# Patient Record
Sex: MALE | Race: WHITE
[De-identification: names, ages, dates, MRNs, and addresses within clinical notes are randomized per-mention and may not be internally consistent; named-entity substitution may affect disease eponyms.]

---

## 2021-09-01 PROBLEM — Z00.00 ENCOUNTER FOR PREVENTIVE HEALTH EXAMINATION: Status: ACTIVE | Noted: 2021-09-01

## 2021-11-02 ENCOUNTER — APPOINTMENT (OUTPATIENT)
Dept: GASTROENTEROLOGY | Facility: CLINIC | Age: 81
End: 2021-11-02

## 2022-06-30 NOTE — H&P ADULT - ASSESSMENT
82yr old male PMH HTN, HLD, CAD,  CABG x 3, sleep apnea, stents presented to cardiology for routine follow-up and clearance for possible knee surgery in the near future. Pt had stress test on 6/22/22 showing ischemia in the inferior walls and abnormal stress ekg response. Pt. now referred for [facility] [place] for further evaluation.   -Consent obtained for cardiac catheterization w/ coronary angiogram and possible stent placement. Pt is competent, has capacity, and understands risks and benefits of procedure. Risks and benefits discussed. Risk discussed included, but not limited to MI, stroke, mortality, major bleeding, arrythmia, or infection. All questions answered     ASA class: II  Creatinine: 1.04  GFR:  72  Bleeding  Risk score: 3.9%  Jose Elias Score: 5 pts prehydration NS 2500cc/hr x 1 hr ordered

## 2022-06-30 NOTE — H&P ADULT - NSHPLABSRESULTS_GEN_ALL_CORE
6/22/22 Cardiac PET revealed abnormal myocardial perfusion imaging with a medium sized area of moderate ischemia seen in the inferior walls. 7/1/22- EKG  SR with 1st degree AV block and PVC  rate 71      6/22/22 Cardiac PET   abnormal myocardial perfusion imaging with a medium sized area of moderate ischemia seen in the inferior walls.  Abnormal stress EKG response showing 2mm st depressions

## 2022-06-30 NOTE — H&P ADULT - NSICDXFAMILYHX_GEN_ALL_CORE_FT
FAMILY HISTORY:  Father  Still living? Unknown  Family history of Hodgkin's lymphoma, Age at diagnosis: Age Unknown

## 2022-06-30 NOTE — CHART NOTE - NSCHARTNOTEFT_GEN_A_CORE
Preop Phone Call:  - Able to Reach Patient:  yes  - Info given to: Maranda Peterson wife of patient having cardiac catheterization  -  and allergies confirmed: yes  - Medication Information Given: yes  - Medications To Take ASA, Ramipril, Toprol XL 50mg Ok to take a.m. meds w/sip of water  - Medications To Hold   - Arrival Time: 07:15  - NPO after: midnight 	  - Understanding of Information Verbalized: yes  will have a  over the age of 18  for d/c home

## 2022-06-30 NOTE — H&P ADULT - HISTORY OF PRESENT ILLNESS
82yr old male PMH HTN, HLD, CAD,  CABG, sleep apnea, stents for routine follow-up. Denies c/o chest pain or SOB. Pt. contemplating knee surgery. Last stress test last year was negative for ischemia. Pt. had a recent stress 6/22/22 which revealed abnormal myocardial perfusion imaging with a medium sized area of moderate ischemia seen in the inferior walls. Pt. now referred for [facility] for further evaluation.  82yr old male PMH HTN, HLD, CAD,  CABG x 3, sleep apnea, stents presented to cardiology for routine follow-up and clearance for possible knee surgery in the near future. Pt had stress test on 6/22/22 showing ischemia in the inferior walls and abnormal stress ekg response. Pt. now referred for [facility] for further evaluation.   Covid 19 Neg

## 2022-06-30 NOTE — H&P ADULT - NSHPSOCIALHISTORY_GEN_ALL_CORE
Pt lives at home with wife Maranda ( [phone]) and Son Arron.   Pt quit smoking 55 years ago  Drinks 3-4 glasses of wine per week  denies all illicit drug use

## 2022-06-30 NOTE — H&P ADULT - NSICDXPASTMEDICALHX_GEN_ALL_CORE_FT
PAST MEDICAL HISTORY:  CAD (coronary atherosclerotic disease)     HLD (hyperlipidemia)     HTN (hypertension)     Sleep apnea      PAST MEDICAL HISTORY:  CAD (coronary atherosclerotic disease) stents    HLD (hyperlipidemia)     HTN (hypertension)     Sleep apnea

## 2022-07-01 ENCOUNTER — OUTPATIENT (OUTPATIENT)
Dept: OUTPATIENT SERVICES | Facility: HOSPITAL | Age: 82
LOS: 1 days | Discharge: HOME | End: 2022-07-01
Payer: MEDICARE

## 2022-07-01 VITALS
HEART RATE: 50 BPM | OXYGEN SATURATION: 99 % | RESPIRATION RATE: 16 BRPM | DIASTOLIC BLOOD PRESSURE: 57 MMHG | SYSTOLIC BLOOD PRESSURE: 150 MMHG

## 2022-07-01 VITALS
WEIGHT: 227.08 LBS | HEIGHT: 67 IN | SYSTOLIC BLOOD PRESSURE: 135 MMHG | TEMPERATURE: 99 F | HEART RATE: 70 BPM | RESPIRATION RATE: 18 BRPM | DIASTOLIC BLOOD PRESSURE: 71 MMHG | OXYGEN SATURATION: 100 %

## 2022-07-01 DIAGNOSIS — R94.39 ABNORMAL RESULT OF OTHER CARDIOVASCULAR FUNCTION STUDY: ICD-10-CM

## 2022-07-01 DIAGNOSIS — Z95.1 PRESENCE OF AORTOCORONARY BYPASS GRAFT: Chronic | ICD-10-CM

## 2022-07-01 PROCEDURE — C1887: CPT

## 2022-07-01 PROCEDURE — C1894: CPT

## 2022-07-01 PROCEDURE — 93455 CORONARY ART/GRFT ANGIO S&I: CPT

## 2022-07-01 PROCEDURE — C1769: CPT

## 2022-07-01 PROCEDURE — 93567 NJX CAR CTH SPRVLV AORTGRPHY: CPT

## 2022-07-01 PROCEDURE — 99153 MOD SED SAME PHYS/QHP EA: CPT

## 2022-07-01 PROCEDURE — 93005 ELECTROCARDIOGRAM TRACING: CPT | Mod: XU

## 2022-07-01 PROCEDURE — 93010 ELECTROCARDIOGRAM REPORT: CPT

## 2022-07-01 PROCEDURE — 99152 MOD SED SAME PHYS/QHP 5/>YRS: CPT

## 2022-07-01 RX ORDER — PITAVASTATIN CALCIUM 1.04 MG/1
1 TABLET, FILM COATED ORAL
Qty: 0 | Refills: 0 | DISCHARGE

## 2022-07-01 RX ORDER — RAMIPRIL 5 MG
1 CAPSULE ORAL
Qty: 0 | Refills: 0 | DISCHARGE

## 2022-07-01 RX ORDER — SODIUM CHLORIDE 9 MG/ML
1000 INJECTION INTRAMUSCULAR; INTRAVENOUS; SUBCUTANEOUS
Refills: 0 | Status: DISCONTINUED | OUTPATIENT
Start: 2022-07-01 | End: 2022-07-01

## 2022-07-01 RX ORDER — SODIUM CHLORIDE 9 MG/ML
250 INJECTION INTRAMUSCULAR; INTRAVENOUS; SUBCUTANEOUS ONCE
Refills: 0 | Status: COMPLETED | OUTPATIENT
Start: 2022-07-01 | End: 2022-07-01

## 2022-07-01 RX ORDER — METOPROLOL TARTRATE 50 MG
1 TABLET ORAL
Qty: 0 | Refills: 0 | DISCHARGE

## 2022-07-01 RX ORDER — ASPIRIN/CALCIUM CARB/MAGNESIUM 324 MG
1 TABLET ORAL
Qty: 0 | Refills: 0 | DISCHARGE

## 2022-07-01 RX ADMIN — SODIUM CHLORIDE 250 MILLILITER(S): 9 INJECTION INTRAMUSCULAR; INTRAVENOUS; SUBCUTANEOUS at 08:15

## 2022-07-01 RX ADMIN — SODIUM CHLORIDE 206 MILLILITER(S): 9 INJECTION INTRAMUSCULAR; INTRAVENOUS; SUBCUTANEOUS at 11:48

## 2022-07-01 NOTE — ASU PATIENT PROFILE, ADULT - SURGICAL SITE INCISION
4 week device check and ov w/NPFW per NPFW. Normal device function. All testing appear wnl. 10.9 years to VELIA. Presenting AsVs @ 90 bpm  AP 1.9%   0.3% MVP On  AT/AF burden 5.1%  3 AT/AF episodes. Appears  pAT/AFL with onset on 4/29/21 on/off thru 5/1/21. Max avg v rate of 139 bpm  Optivol WNL  PVC singles 0.4/hr  No changes  See Paceart report under the Cardiology tab. Follow up in 3 months either in office or remotely. Discussed home monitor. Revisited the need to send manual transmissions verses automatic transmissions. Patient verbalized understanding.
no

## 2022-07-01 NOTE — ASU PATIENT PROFILE, ADULT - FALL HARM RISK - UNIVERSAL INTERVENTIONS
Bed in lowest position, wheels locked, appropriate side rails in place/Call bell, personal items and telephone in reach/Instruct patient to call for assistance before getting out of bed or chair/Non-slip footwear when patient is out of bed/[place] to call system/Physically safe environment - no spills, clutter or unnecessary equipment/Purposeful Proactive Rounding/Room/bathroom lighting operational, light cord in reach

## 2022-07-01 NOTE — ASU PATIENT PROFILE, ADULT - NSICDXPASTMEDICALHX_GEN_ALL_CORE_FT
PAST MEDICAL HISTORY:  CAD (coronary atherosclerotic disease)     HLD (hyperlipidemia)     HTN (hypertension)     Sleep apnea

## 2022-07-01 NOTE — ASU PREOP CHECKLIST - HEIGHT IN INCHES
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 He needs medical clearance to proceed.  He has no history of diabetes, no vision changes, no syncopal episodes no history of seizures. He's been doing this for over 20 years. The patient denied any chest pain, no sob, no dixon, no  pnd, no orthopnea, no headache, no changes in vision, no numbness or tingling, no nausea, no diarrhea, no abdominal pain, no fevers, no chills, no bright red blood per rectum, no  difficulty urinating, no burning during micturition, no depressed mood, no other concerns.      
7

## 2022-07-01 NOTE — PACU DISCHARGE NOTE - COMMENTS
Pt alert and oriented x 4, no c/o chest pain or SOB.  Right groin tegaderm intact, no s/s bleeding or hematoma.  Right pedal pulse by doppler. Written discharge instructions reviewed with patient and understanding verbalized.  Patient instructed to f/u with Dr. Curiel.  IV discontinued, no s/s of bleeding or redness noted. Pt discharged to home, left unit in wheelchair in Stable condition.

## 2022-07-05 DIAGNOSIS — I10 ESSENTIAL (PRIMARY) HYPERTENSION: ICD-10-CM

## 2022-07-05 DIAGNOSIS — I25.10 ATHEROSCLEROTIC HEART DISEASE OF NATIVE CORONARY ARTERY WITHOUT ANGINA PECTORIS: ICD-10-CM

## 2022-07-05 DIAGNOSIS — Z95.5 PRESENCE OF CORONARY ANGIOPLASTY IMPLANT AND GRAFT: ICD-10-CM

## 2023-05-04 ENCOUNTER — OFFICE (OUTPATIENT)
Dept: URBAN - METROPOLITAN AREA CLINIC 102 | Facility: CLINIC | Age: 83
Setting detail: OPHTHALMOLOGY
End: 2023-05-04
Payer: MEDICARE

## 2023-05-04 VITALS — HEIGHT: 60 IN

## 2023-05-04 DIAGNOSIS — H25.13: ICD-10-CM

## 2023-05-04 DIAGNOSIS — H53.022: ICD-10-CM

## 2023-05-04 DIAGNOSIS — D31.31: ICD-10-CM

## 2023-05-04 DIAGNOSIS — H40.013: ICD-10-CM

## 2023-05-04 PROCEDURE — 92020 GONIOSCOPY: CPT | Performed by: OPHTHALMOLOGY

## 2023-05-04 PROCEDURE — 92133 CPTRZD OPH DX IMG PST SGM ON: CPT | Performed by: OPHTHALMOLOGY

## 2023-05-04 PROCEDURE — 99214 OFFICE O/P EST MOD 30 MIN: CPT | Performed by: OPHTHALMOLOGY

## 2023-05-04 PROCEDURE — 92083 EXTENDED VISUAL FIELD XM: CPT | Performed by: OPHTHALMOLOGY

## 2023-05-04 ASSESSMENT — KERATOMETRY
OD_K1POWER_DIOPTERS: 44.75
OD_K2POWER_DIOPTERS: 45.50
METHOD_AUTO_MANUAL: AUTO
OS_K2POWER_DIOPTERS: 46.25
OS_K1POWER_DIOPTERS: 43.75
OD_AXISANGLE_DEGREES: 161
OS_AXISANGLE_DEGREES: 075

## 2023-05-04 ASSESSMENT — TONOMETRY
OS_IOP_MMHG: 18
OS_IOP_MMHG: 16
OD_IOP_MMHG: 20
OD_IOP_MMHG: 15

## 2023-05-04 ASSESSMENT — AXIALLENGTH_DERIVED
OD_AL: 22.2483
OS_AL: 21.9467
OD_AL: 22.8716
OS_AL: 22.0739
OD_AL: 22.7804
OS_AL: 22.0739

## 2023-05-04 ASSESSMENT — REFRACTION_MANIFEST
OS_AXIS: 170
OS_CYLINDER: -4.50
OS_CYLINDER: -4.50
OD_CYLINDER: -1.25
OS_ADD: +2.25
OS_SPHERE: +5.00
OS_VA1: 20/40
OS_SPHERE: +5.00
OS_VA1: 20/50-
OD_AXIS: 80
OD_ADD: +2.25
OD_SPHERE: +0.75
OS_AXIS: 170
OD_AXIS: 90
OD_SPHERE: +2.75
OD_VA1: 20/40-1
OD_VA1: 20/30
OD_CYLINDER: -0.75

## 2023-05-04 ASSESSMENT — SPHEQUIV_DERIVED
OS_SPHEQUIV: 2.75
OS_SPHEQUIV: 2.75
OD_SPHEQUIV: 0.375
OD_SPHEQUIV: 2.125
OS_SPHEQUIV: 3.125
OD_SPHEQUIV: 0.625

## 2023-05-04 ASSESSMENT — REFRACTION_CURRENTRX
OS_OVR_VA: 20/
OS_CYLINDER: -3.00
OS_SPHERE: +4.50
OD_AXIS: 045
OS_AXIS: 179
OD_CYLINDER: -0.50
OD_VPRISM_DIRECTION: SV
OD_SPHERE: +2.00
OS_VPRISM_DIRECTION: SV
OD_OVR_VA: 20/

## 2023-05-04 ASSESSMENT — REFRACTION_AUTOREFRACTION
OS_AXIS: 170
OD_CYLINDER: -0.75
OD_AXIS: 082
OS_SPHERE: +5.25
OS_CYLINDER: -4.25
OD_SPHERE: +1.00

## 2023-05-04 ASSESSMENT — VISUAL ACUITY
OD_BCVA: 20/60
OS_BCVA: 20/50+

## 2023-05-04 ASSESSMENT — CONFRONTATIONAL VISUAL FIELD TEST (CVF)
OS_FINDINGS: FULL
OD_FINDINGS: FULL

## 2023-06-20 ENCOUNTER — OFFICE (OUTPATIENT)
Dept: URBAN - METROPOLITAN AREA CLINIC 102 | Facility: CLINIC | Age: 83
Setting detail: OPHTHALMOLOGY
End: 2023-06-20
Payer: MEDICARE

## 2023-06-20 DIAGNOSIS — D31.31: ICD-10-CM

## 2023-06-20 DIAGNOSIS — H25.12: ICD-10-CM

## 2023-06-20 DIAGNOSIS — H25.13: ICD-10-CM

## 2023-06-20 DIAGNOSIS — H40.013: ICD-10-CM

## 2023-06-20 DIAGNOSIS — H53.022: ICD-10-CM

## 2023-06-20 PROCEDURE — 99213 OFFICE O/P EST LOW 20 MIN: CPT | Performed by: OPHTHALMOLOGY

## 2023-06-20 PROCEDURE — 92136 OPHTHALMIC BIOMETRY: CPT | Performed by: OPHTHALMOLOGY

## 2023-06-20 ASSESSMENT — KERATOMETRY
OD_AXISANGLE_DEGREES: 58
OD_K2POWER_DIOPTERS: 45.50
OS_CYLPOWER_DEGREES: 3
OS_K1POWER_DIOPTERS: 43.75
OD_AXISANGLE2_DEGREES: 148
OS_AXISANGLE2_DEGREES: 084
OS_K2POWER_DIOPTERS: 46.75
OS_K1K2_AVERAGE: 45.25
METHOD_AUTO_MANUAL: AUTO
OD_K2POWER_DIOPTERS: 45.50
OS_AXISANGLE_DEGREES: 084
OD_CYLAXISANGLE_DEGREES: 148
OS_K1POWER_DIOPTERS: 43.75
OD_AXISANGLE_DEGREES: 148
OS_K2POWER_DIOPTERS: 46.75
OD_K1POWER_DIOPTERS: 45.00
OS_AXISANGLE_DEGREES: 174
OS_CYLAXISANGLE_DEGREES: 084
OD_K1POWER_DIOPTERS: 45.00
OD_K1K2_AVERAGE: 45.25
OD_CYLPOWER_DEGREES: 0.5

## 2023-06-20 ASSESSMENT — REFRACTION_MANIFEST
OD_CYLINDER: -0.75
OS_AXIS: 170
OD_ADD: +2.25
OD_VA1: 20/30
OD_CYLINDER: -1.25
OS_VA1: 20/40
OS_SPHERE: +5.00
OS_CYLINDER: -4.50
OD_SPHERE: +2.75
OD_AXIS: 90
OS_AXIS: 170
OS_CYLINDER: -4.50
OS_ADD: +2.25
OD_AXIS: 80
OD_SPHERE: +0.75
OS_SPHERE: +5.00
OD_VA1: 20/40-1
OS_VA1: 20/50-

## 2023-06-20 ASSESSMENT — AXIALLENGTH_DERIVED
OS_AL: 21.9938
OS_AL: 21.8676
OD_AL: 22.2075
OD_AL: 22.8285
OD_AL: 22.8742
OS_AL: 21.9938

## 2023-06-20 ASSESSMENT — REFRACTION_AUTOREFRACTION
OS_SPHERE: +5.50
OD_CYLINDER: -1.50
OS_CYLINDER: -4.75
OD_AXIS: 086
OS_AXIS: 175
OD_SPHERE: +1.00

## 2023-06-20 ASSESSMENT — SPHEQUIV_DERIVED
OS_SPHEQUIV: 2.75
OS_SPHEQUIV: 3.125
OD_SPHEQUIV: 0.25
OD_SPHEQUIV: 0.375
OS_SPHEQUIV: 2.75
OD_SPHEQUIV: 2.125

## 2023-06-20 ASSESSMENT — REFRACTION_CURRENTRX
OD_VPRISM_DIRECTION: SV
OD_OVR_VA: 20/
OS_AXIS: 179
OS_SPHERE: +4.50
OS_OVR_VA: 20/
OS_CYLINDER: -3.00
OD_CYLINDER: -0.50
OD_SPHERE: +2.00
OS_VPRISM_DIRECTION: SV
OD_AXIS: 045

## 2023-06-20 ASSESSMENT — TONOMETRY
OS_IOP_MMHG: 16
OD_IOP_MMHG: 15

## 2023-06-20 ASSESSMENT — CONFRONTATIONAL VISUAL FIELD TEST (CVF)
OD_FINDINGS: FULL
OS_FINDINGS: FULL

## 2023-06-20 ASSESSMENT — VISUAL ACUITY
OD_BCVA: 20/70-1
OS_BCVA: 20/50-1

## 2023-07-10 PROBLEM — H25.13 CATARACT SENILE NUCLEAR SCLEROSIS; LEFT EYE, BOTH EYES: Status: ACTIVE | Noted: 2023-06-20

## 2023-07-10 PROBLEM — H25.12 CATARACT SENILE NUCLEAR SCLEROSIS; LEFT EYE, BOTH EYES: Status: ACTIVE | Noted: 2023-06-20

## 2023-07-12 ENCOUNTER — AMBULATORY SURGERY (OUTPATIENT)
Dept: URBAN - METROPOLITAN AREA SURGERY 14 | Facility: SURGERY | Age: 83
Setting detail: OPHTHALMOLOGY
End: 2023-07-12
Payer: MEDICARE

## 2023-07-12 DIAGNOSIS — H25.11: ICD-10-CM

## 2023-07-12 PROCEDURE — 66984 XCAPSL CTRC RMVL W/O ECP: CPT | Performed by: OPHTHALMOLOGY

## 2023-07-13 ENCOUNTER — RX ONLY (RX ONLY)
Age: 83
End: 2023-07-13

## 2023-07-13 ENCOUNTER — OFFICE (OUTPATIENT)
Dept: URBAN - METROPOLITAN AREA CLINIC 102 | Facility: CLINIC | Age: 83
Setting detail: OPHTHALMOLOGY
End: 2023-07-13
Payer: MEDICARE

## 2023-07-13 DIAGNOSIS — Z96.1: ICD-10-CM

## 2023-07-13 PROCEDURE — 99024 POSTOP FOLLOW-UP VISIT: CPT | Performed by: OPHTHALMOLOGY

## 2023-07-13 ASSESSMENT — REFRACTION_AUTOREFRACTION
OD_SPHERE: -1.00
OS_AXIS: 173
OD_SPHERE: -1.00
OS_CYLINDER: -3.75
OD_CYLINDER: -2.25
OS_SPHERE: +5.25
OD_CYLINDER: -2.25
OD_AXIS: 074
OS_CYLINDER: -3.75
OS_SPHERE: +5.25
OS_AXIS: 173
OD_AXIS: 074

## 2023-07-13 ASSESSMENT — CONFRONTATIONAL VISUAL FIELD TEST (CVF)
OD_FINDINGS: FULL
OD_FINDINGS: FULL
OS_FINDINGS: FULL
OS_FINDINGS: FULL

## 2023-07-13 ASSESSMENT — REFRACTION_MANIFEST
OS_AXIS: 170
OS_CYLINDER: -4.50
OS_VA1: 20/50-
OD_CYLINDER: -1.25
OD_AXIS: 90
OD_AXIS: 80
OD_ADD: +2.25
OS_AXIS: 170
OS_VA1: 20/50-
OD_SPHERE: +0.75
OS_AXIS: 170
OD_AXIS: 80
OD_ADD: +2.25
OS_VA1: 20/40
OD_SPHERE: +2.75
OD_VA1: 20/40-1
OS_ADD: +2.25
OD_AXIS: 90
OS_SPHERE: +5.00
OD_VA1: 20/30
OS_CYLINDER: -4.50
OD_CYLINDER: -0.75
OD_VA1: 20/30
OS_VA1: 20/40
OD_CYLINDER: -0.75
OS_SPHERE: +5.00
OD_SPHERE: +2.75
OD_SPHERE: +0.75
OS_ADD: +2.25
OD_CYLINDER: -1.25
OS_CYLINDER: -4.50
OD_VA1: 20/40-1
OS_AXIS: 170
OS_CYLINDER: -4.50
OS_SPHERE: +5.00
OS_SPHERE: +5.00

## 2023-07-13 ASSESSMENT — SPHEQUIV_DERIVED
OS_SPHEQUIV: 3.375
OD_SPHEQUIV: 2.125
OS_SPHEQUIV: 2.75
OD_SPHEQUIV: -2.125
OS_SPHEQUIV: 2.75
OD_SPHEQUIV: -2.125
OS_SPHEQUIV: 3.375
OD_SPHEQUIV: 0.375
OD_SPHEQUIV: 2.125
OD_SPHEQUIV: 0.375

## 2023-07-13 ASSESSMENT — KERATOMETRY
OS_AXISANGLE_DEGREES: 086
METHOD_AUTO_MANUAL: AUTO
OS_K1POWER_DIOPTERS: 43.75
OD_AXISANGLE_DEGREES: 021
OD_K1POWER_DIOPTERS: 45.00
OS_K2POWER_DIOPTERS: 46.50
OS_K1POWER_DIOPTERS: 43.75
METHOD_AUTO_MANUAL: AUTO
OS_AXISANGLE_DEGREES: 086
OD_K2POWER_DIOPTERS: 47.00
OD_K2POWER_DIOPTERS: 47.00
OD_K1POWER_DIOPTERS: 45.00
OD_AXISANGLE_DEGREES: 021
OS_K2POWER_DIOPTERS: 46.50

## 2023-07-13 ASSESSMENT — REFRACTION_CURRENTRX
OD_SPHERE: +2.00
OS_CYLINDER: -3.00
OD_VPRISM_DIRECTION: SV
OD_CYLINDER: -0.50
OS_SPHERE: +4.50
OD_OVR_VA: 20/
OS_OVR_VA: 20/
OS_AXIS: 179
OD_CYLINDER: -0.50
OD_AXIS: 045
OS_SPHERE: +4.50
OD_OVR_VA: 20/
OS_VPRISM_DIRECTION: SV
OD_AXIS: 045
OD_SPHERE: +2.00
OS_AXIS: 179
OS_CYLINDER: -3.00
OS_OVR_VA: 20/
OD_VPRISM_DIRECTION: SV
OS_VPRISM_DIRECTION: SV

## 2023-07-13 ASSESSMENT — AXIALLENGTH_DERIVED
OD_AL: 22.5735
OD_AL: 21.9661
OD_AL: 21.9661
OS_AL: 22.0338
OD_AL: 23.5019
OD_AL: 22.5735
OD_AL: 23.5019
OS_AL: 21.8234
OS_AL: 22.0338
OS_AL: 21.8234

## 2023-07-13 ASSESSMENT — TONOMETRY
OS_IOP_MMHG: 19
OS_IOP_MMHG: 20

## 2023-07-13 ASSESSMENT — VISUAL ACUITY
OS_BCVA: 20/60+
OD_BCVA: 20/80
OD_BCVA: 20/
OS_BCVA: 20/100-1

## 2023-07-13 ASSESSMENT — CORNEAL EDEMA - MICROCYSTIC EPITHELIAL EDEMA (MCE)
OD_MCE: 2+
OD_MCE: 3+

## 2023-07-14 ENCOUNTER — RX ONLY (RX ONLY)
Age: 83
End: 2023-07-14

## 2023-07-14 ENCOUNTER — OFFICE (OUTPATIENT)
Dept: URBAN - METROPOLITAN AREA CLINIC 115 | Facility: CLINIC | Age: 83
Setting detail: OPHTHALMOLOGY
End: 2023-07-14
Payer: MEDICARE

## 2023-07-14 DIAGNOSIS — Z96.1: ICD-10-CM

## 2023-07-14 PROCEDURE — 99024 POSTOP FOLLOW-UP VISIT: CPT | Performed by: OPTOMETRIST

## 2023-07-14 ASSESSMENT — AXIALLENGTH_DERIVED
OS_AL: 22.0338
OD_AL: 21.9661
OD_AL: 23.5019
OD_AL: 22.5735
OS_AL: 22.0338
OS_AL: 21.8234

## 2023-07-14 ASSESSMENT — REFRACTION_MANIFEST
OS_CYLINDER: -4.50
OS_SPHERE: +5.00
OS_VA1: 20/50-
OS_ADD: +2.25
OD_AXIS: 80
OD_SPHERE: +2.75
OS_VA1: 20/40
OS_CYLINDER: -4.50
OD_VA1: 20/30
OS_AXIS: 170
OD_SPHERE: +0.75
OS_AXIS: 170
OD_CYLINDER: -1.25
OD_AXIS: 90
OD_ADD: +2.25
OS_SPHERE: +5.00
OD_CYLINDER: -0.75
OD_VA1: 20/40-1

## 2023-07-14 ASSESSMENT — REFRACTION_AUTOREFRACTION
OS_SPHERE: +5.25
OS_CYLINDER: -3.75
OD_AXIS: 074
OD_CYLINDER: -2.25
OD_SPHERE: -1.00
OS_AXIS: 173

## 2023-07-14 ASSESSMENT — KERATOMETRY
OD_K2POWER_DIOPTERS: 47.00
OS_K2POWER_DIOPTERS: 46.50
OS_K1POWER_DIOPTERS: 43.75
METHOD_AUTO_MANUAL: AUTO
OS_AXISANGLE_DEGREES: 086
OD_AXISANGLE_DEGREES: 021
OD_K1POWER_DIOPTERS: 45.00

## 2023-07-14 ASSESSMENT — REFRACTION_CURRENTRX
OS_SPHERE: +4.50
OS_AXIS: 179
OS_CYLINDER: -3.00
OS_VPRISM_DIRECTION: SV
OD_CYLINDER: -0.50
OD_AXIS: 045
OD_VPRISM_DIRECTION: SV
OD_OVR_VA: 20/
OS_OVR_VA: 20/
OD_SPHERE: +2.00

## 2023-07-14 ASSESSMENT — SPHEQUIV_DERIVED
OD_SPHEQUIV: -2.125
OS_SPHEQUIV: 2.75
OD_SPHEQUIV: 0.375
OS_SPHEQUIV: 3.375
OD_SPHEQUIV: 2.125
OS_SPHEQUIV: 2.75

## 2023-07-14 ASSESSMENT — TONOMETRY
OD_IOP_MMHG: 15
OS_IOP_MMHG: 18

## 2023-07-14 ASSESSMENT — VISUAL ACUITY
OD_BCVA: 20/40-1
OS_BCVA: 20/150

## 2023-07-16 ENCOUNTER — OFFICE (OUTPATIENT)
Dept: URBAN - METROPOLITAN AREA CLINIC 12 | Facility: CLINIC | Age: 83
Setting detail: OPHTHALMOLOGY
End: 2023-07-16
Payer: MEDICARE

## 2023-07-16 DIAGNOSIS — Z96.1: ICD-10-CM

## 2023-07-16 PROCEDURE — 99024 POSTOP FOLLOW-UP VISIT: CPT | Performed by: OPTOMETRIST

## 2023-07-16 ASSESSMENT — KERATOMETRY
OS_K2POWER_DIOPTERS: 46.75
METHOD_AUTO_MANUAL: AUTO
OS_K1POWER_DIOPTERS: 43.75
OD_K2POWER_DIOPTERS: 45.00
OD_AXISANGLE_DEGREES: 083
OS_AXISANGLE_DEGREES: 167
OD_K1POWER_DIOPTERS: 44.50

## 2023-07-16 ASSESSMENT — REFRACTION_CURRENTRX
OD_AXIS: 090
OS_VPRISM_DIRECTION: SV
OS_OVR_VA: 20/
OS_AXIS: 160
OS_AXIS: 170
OD_SPHERE: +2.75
OD_CYLINDER: -1.25
OS_OVR_VA: 20/
OD_AXIS: 090
OD_SPHERE: +5.00
OS_SPHERE: +5.00
OS_SPHERE: +7.00
OD_CYLINDER: -1.25
OS_CYLINDER: -4.50
OD_OVR_VA: 20/
OS_VPRISM_DIRECTION: SV
OD_VPRISM_DIRECTION: SV
OD_OVR_VA: 20/
OS_CYLINDER: -4.25
OD_VPRISM_DIRECTION: SV

## 2023-07-16 ASSESSMENT — REFRACTION_AUTOREFRACTION
OS_SPHERE: +4.75
OD_SPHERE: +1.00
OD_CYLINDER: -2.00
OD_AXIS: 056
OS_AXIS: 168
OS_CYLINDER: -4.00

## 2023-07-16 ASSESSMENT — TONOMETRY
OD_IOP_MMHG: 16
OS_IOP_MMHG: 16

## 2023-07-16 ASSESSMENT — REFRACTION_MANIFEST
OD_CYLINDER: -1.25
OS_AXIS: 170
OS_SPHERE: +5.00
OD_CYLINDER: -0.75
OD_VA1: 20/30
OS_AXIS: 170
OD_SPHERE: +0.75
OD_AXIS: 80
OD_VA1: 20/40-1
OS_VA1: 20/50-
OD_SPHERE: +2.75
OS_ADD: +2.25
OS_CYLINDER: -4.50
OD_AXIS: 90
OS_VA1: 20/40
OS_CYLINDER: -4.50
OS_SPHERE: +5.00
OD_ADD: +2.25

## 2023-07-16 ASSESSMENT — SPHEQUIV_DERIVED
OD_SPHEQUIV: 0.375
OS_SPHEQUIV: 2.75
OD_SPHEQUIV: 2.125
OD_SPHEQUIV: 0

## 2023-07-16 ASSESSMENT — AXIALLENGTH_DERIVED
OS_AL: 21.9938
OD_AL: 22.3714
OD_AL: 23.0018
OD_AL: 23.1415
OS_AL: 21.9938
OS_AL: 21.9938

## 2023-07-16 ASSESSMENT — CONFRONTATIONAL VISUAL FIELD TEST (CVF)
OD_FINDINGS: FULL
OS_FINDINGS: FULL

## 2023-07-16 ASSESSMENT — CORNEAL EDEMA - MICROCYSTIC EPITHELIAL EDEMA (MCE): OD_MCE: 2+

## 2023-07-16 ASSESSMENT — VISUAL ACUITY
OS_BCVA: 20/80-1
OD_BCVA: 20/40-2

## 2023-07-20 ENCOUNTER — OFFICE (OUTPATIENT)
Dept: URBAN - METROPOLITAN AREA CLINIC 102 | Facility: CLINIC | Age: 83
Setting detail: OPHTHALMOLOGY
End: 2023-07-20
Payer: MEDICARE

## 2023-07-20 DIAGNOSIS — Z96.1: ICD-10-CM

## 2023-07-20 PROBLEM — H40.032 NARROW ANGLE GLAUCOMA SUSPECT; LEFT EYE: Status: ACTIVE | Noted: 2023-07-13

## 2023-07-20 PROCEDURE — 99024 POSTOP FOLLOW-UP VISIT: CPT | Performed by: OPHTHALMOLOGY

## 2023-07-20 ASSESSMENT — REFRACTION_CURRENTRX
OS_SPHERE: +5.00
OD_CYLINDER: -1.25
OD_OVR_VA: 20/
OD_SPHERE: +5.00
OD_SPHERE: +2.75
OD_OVR_VA: 20/
OS_AXIS: 170
OS_OVR_VA: 20/
OD_VPRISM_DIRECTION: SV
OS_OVR_VA: 20/
OD_AXIS: 090
OD_AXIS: 090
OS_CYLINDER: -4.25
OD_VPRISM_DIRECTION: SV
OS_AXIS: 160
OD_CYLINDER: -1.25
OS_CYLINDER: -4.50
OS_VPRISM_DIRECTION: SV
OS_VPRISM_DIRECTION: SV
OS_SPHERE: +7.00

## 2023-07-20 ASSESSMENT — REFRACTION_MANIFEST
OS_VA1: 20/50-
OS_SPHERE: +5.00
OD_CYLINDER: -0.75
OD_AXIS: 80
OD_VA1: 20/40-1
OD_VA1: 20/40+2
OD_AXIS: 080
OS_AXIS: 170
OD_SPHERE: +1.00
OD_CYLINDER: -1.00
OD_SPHERE: +0.75
OS_CYLINDER: -4.50

## 2023-07-20 ASSESSMENT — SPHEQUIV_DERIVED
OS_SPHEQUIV: 3
OD_SPHEQUIV: 0.25
OS_SPHEQUIV: 2.75
OD_SPHEQUIV: 0.375
OD_SPHEQUIV: 0.5

## 2023-07-20 ASSESSMENT — REFRACTION_AUTOREFRACTION
OS_CYLINDER: -4.00
OS_AXIS: 174
OD_SPHERE: +0.75
OD_CYLINDER: -1.00
OS_SPHERE: +5.00
OD_AXIS: 080

## 2023-07-20 ASSESSMENT — CONFRONTATIONAL VISUAL FIELD TEST (CVF)
OS_FINDINGS: FULL
OD_FINDINGS: FULL

## 2023-07-20 ASSESSMENT — AXIALLENGTH_DERIVED
OD_AL: 22.8742
OD_AL: 22.783
OD_AL: 22.8285
OS_AL: 21.9938
OS_AL: 21.9095

## 2023-07-20 ASSESSMENT — VISUAL ACUITY
OS_BCVA: 20/40-2
OD_BCVA: 20/40

## 2023-07-20 ASSESSMENT — CORNEAL EDEMA - FOLDS/STRIAE: OD_FOLDSSTRIAE: T

## 2023-07-20 ASSESSMENT — TONOMETRY
OS_IOP_MMHG: 16
OD_IOP_MMHG: 15

## 2023-07-20 ASSESSMENT — KERATOMETRY
OD_AXISANGLE_DEGREES: 118
OS_K2POWER_DIOPTERS: 46.75
OD_K2POWER_DIOPTERS: 45.50
OS_K1POWER_DIOPTERS: 43.75
OD_K1POWER_DIOPTERS: 45.00
METHOD_AUTO_MANUAL: AUTO
OS_AXISANGLE_DEGREES: 084

## 2023-08-10 ENCOUNTER — OFFICE (OUTPATIENT)
Dept: URBAN - METROPOLITAN AREA CLINIC 102 | Facility: CLINIC | Age: 83
Setting detail: OPHTHALMOLOGY
End: 2023-08-10
Payer: MEDICARE

## 2023-08-10 DIAGNOSIS — Z96.1: ICD-10-CM

## 2023-08-10 DIAGNOSIS — H52.7: ICD-10-CM

## 2023-08-10 PROCEDURE — 92015 DETERMINE REFRACTIVE STATE: CPT | Performed by: OPHTHALMOLOGY

## 2023-08-10 PROCEDURE — 99024 POSTOP FOLLOW-UP VISIT: CPT | Performed by: OPHTHALMOLOGY

## 2023-08-10 ASSESSMENT — REFRACTION_MANIFEST
OD_ADD: +2.50
OS_AXIS: 170
OS_VA1: 20/50-
OS_SPHERE: +5.00
OD_VA1: 20/25-2
OS_VA1: 20/40
OS_AXIS: 170
OS_ADD: +2.50
OS_CYLINDER: -4.50
OD_SPHERE: +0.75
OD_VA1: 20/40-1
OS_CYLINDER: -4.50
OD_SPHERE: +0.25
OS_SPHERE: +4.75
OD_AXIS: 80
OD_AXIS: 080
OD_CYLINDER: -0.75
OD_CYLINDER: -1.75

## 2023-08-10 ASSESSMENT — REFRACTION_AUTOREFRACTION
OD_SPHERE: +0.50
OD_CYLINDER: -1.25
OD_AXIS: 083
OS_AXIS: 175
OS_SPHERE: +5.75
OS_CYLINDER: -4.50

## 2023-08-10 ASSESSMENT — REFRACTION_CURRENTRX
OD_CYLINDER: -1.25
OS_AXIS: 168
OD_CYLINDER: -1.25
OS_CYLINDER: -4.50
OD_VPRISM_DIRECTION: SV
OS_SPHERE: +7.25
OD_SPHERE: +5.00
OS_VPRISM_DIRECTION: SV
OS_AXIS: 170
OD_OVR_VA: 20/
OD_VPRISM_DIRECTION: SV
OS_SPHERE: +5.00
OD_AXIS: 090
OS_OVR_VA: 20/
OD_AXIS: 96
OS_VPRISM_DIRECTION: SV
OS_CYLINDER: -4.50
OD_OVR_VA: 20/
OD_SPHERE: +2.50
OS_OVR_VA: 20/

## 2023-08-10 ASSESSMENT — KERATOMETRY
OS_K1POWER_DIOPTERS: 43.75
OS_K2POWER_DIOPTERS: 46.75
OD_K2POWER_DIOPTERS: 45.75
OD_AXISANGLE_DEGREES: 160
METHOD_AUTO_MANUAL: AUTO
OD_K1POWER_DIOPTERS: 44.50
OS_AXISANGLE_DEGREES: 079

## 2023-08-10 ASSESSMENT — SPHEQUIV_DERIVED
OS_SPHEQUIV: 2.75
OS_SPHEQUIV: 3.5
OD_SPHEQUIV: 0.375
OS_SPHEQUIV: 2.5
OD_SPHEQUIV: -0.125
OD_SPHEQUIV: -0.625

## 2023-08-10 ASSESSMENT — AXIALLENGTH_DERIVED
OD_AL: 23.2437
OS_AL: 22.0788
OS_AL: 21.7428
OD_AL: 23.0562
OD_AL: 22.8716
OS_AL: 21.9938

## 2023-08-10 ASSESSMENT — VISUAL ACUITY
OS_BCVA: 20/30-1
OD_BCVA: 20/40

## 2023-08-10 ASSESSMENT — CONFRONTATIONAL VISUAL FIELD TEST (CVF)
OD_FINDINGS: FULL
OS_FINDINGS: FULL

## 2023-08-10 ASSESSMENT — TONOMETRY
OS_IOP_MMHG: 16
OD_IOP_MMHG: 12

## 2023-10-19 NOTE — H&P ADULT - NSICDXPASTSURGICALHX_GEN_ALL_CORE_FT
PAST SURGICAL HISTORY:  S/P CABG (coronary artery bypass graft)      Detail Level: Detailed Was A Bandage Applied: Yes Punch Size In Mm: 4 Size Of Lesion In Cm (Optional): 0 X Size Of Lesion In Cm (Optional): 1.9 Depth Of Punch Biopsy: dermis Biopsy Type: H and E Anesthesia Type: 1% lidocaine with epinephrine Anesthesia Volume In Cc (Will Not Render If 0): 0.5 Hemostasis: None Epidermal Sutures: 4-0 Ethilon PAST SURGICAL HISTORY:  S/P CABG (coronary artery bypass graft) x3     Wound Care: Petrolatum Dressing: bandage Suture Removal: 14 days Patient Will Remove Sutures At Home?: No Lab: 6 Lab Facility: 3 Consent: Written consent was obtained and risks were reviewed including but not limited to scarring, infection, bleeding, scabbing, incomplete removal, nerve damage and allergy to anesthesia. Post-Care Instructions: I reviewed with the patient in detail post-care instructions. Patient is to keep the biopsy site dry overnight, and then apply bacitracin twice daily until healed. Patient may apply hydrogen peroxide soaks to remove any crusting. Home Suture Removal Text: Patient was provided a home suture removal kit and will remove their sutures at home.  If they have any questions or difficulties they will call the office. No Notification Instructions: Patient will be notified of biopsy results. However, patient instructed to call the office if not contacted within 2 weeks. Billing Type: Third-Party Bill Information: Selecting Yes will display possible errors in your note based on the variables you have selected. This validation is only offered as a suggestion for you. PLEASE NOTE THAT THE VALIDATION TEXT WILL BE REMOVED WHEN YOU FINALIZE YOUR NOTE. IF YOU WANT TO FAX A PRELIMINARY NOTE YOU WILL NEED TO TOGGLE THIS TO 'NO' IF YOU DO NOT WANT IT IN YOUR FAXED NOTE.

## 2024-08-22 ENCOUNTER — INPATIENT (INPATIENT)
Facility: HOSPITAL | Age: 84
LOS: 3 days | Discharge: HOME | DRG: 149 | End: 2024-08-26
Attending: FAMILY MEDICINE | Admitting: STUDENT IN AN ORGANIZED HEALTH CARE EDUCATION/TRAINING PROGRAM
Payer: MEDICARE

## 2024-08-22 VITALS
HEART RATE: 41 BPM | DIASTOLIC BLOOD PRESSURE: 57 MMHG | OXYGEN SATURATION: 100 % | RESPIRATION RATE: 17 BRPM | SYSTOLIC BLOOD PRESSURE: 169 MMHG | TEMPERATURE: 98 F

## 2024-08-22 DIAGNOSIS — Z95.1 PRESENCE OF AORTOCORONARY BYPASS GRAFT: Chronic | ICD-10-CM

## 2024-08-22 LAB
ALBUMIN SERPL ELPH-MCNC: 3.5 G/DL — SIGNIFICANT CHANGE UP (ref 3.3–5)
ALP SERPL-CCNC: 52 U/L — SIGNIFICANT CHANGE UP (ref 40–120)
ALT FLD-CCNC: 24 U/L — SIGNIFICANT CHANGE UP (ref 12–78)
ANION GAP SERPL CALC-SCNC: 5 MMOL/L — SIGNIFICANT CHANGE UP (ref 5–17)
APPEARANCE UR: CLEAR — SIGNIFICANT CHANGE UP
AST SERPL-CCNC: 18 U/L — SIGNIFICANT CHANGE UP (ref 15–37)
BASOPHILS # BLD AUTO: 0.05 K/UL — SIGNIFICANT CHANGE UP (ref 0–0.2)
BASOPHILS NFR BLD AUTO: 0.9 % — SIGNIFICANT CHANGE UP (ref 0–2)
BILIRUB SERPL-MCNC: 1.7 MG/DL — HIGH (ref 0.2–1.2)
BILIRUB UR-MCNC: NEGATIVE — SIGNIFICANT CHANGE UP
BUN SERPL-MCNC: 16 MG/DL — SIGNIFICANT CHANGE UP (ref 7–23)
CALCIUM SERPL-MCNC: 9.9 MG/DL — SIGNIFICANT CHANGE UP (ref 8.5–10.1)
CHLORIDE SERPL-SCNC: 110 MMOL/L — HIGH (ref 96–108)
CO2 SERPL-SCNC: 27 MMOL/L — SIGNIFICANT CHANGE UP (ref 22–31)
COLOR SPEC: YELLOW — SIGNIFICANT CHANGE UP
CREAT SERPL-MCNC: 0.98 MG/DL — SIGNIFICANT CHANGE UP (ref 0.5–1.3)
DIFF PNL FLD: NEGATIVE — SIGNIFICANT CHANGE UP
EGFR: 77 ML/MIN/1.73M2 — SIGNIFICANT CHANGE UP
EOSINOPHIL # BLD AUTO: 0.2 K/UL — SIGNIFICANT CHANGE UP (ref 0–0.5)
EOSINOPHIL NFR BLD AUTO: 3.6 % — SIGNIFICANT CHANGE UP (ref 0–6)
GLUCOSE SERPL-MCNC: 106 MG/DL — HIGH (ref 70–99)
GLUCOSE UR QL: NEGATIVE MG/DL — SIGNIFICANT CHANGE UP
HCT VFR BLD CALC: 44.5 % — SIGNIFICANT CHANGE UP (ref 39–50)
HGB BLD-MCNC: 14.7 G/DL — SIGNIFICANT CHANGE UP (ref 13–17)
IMM GRANULOCYTES NFR BLD AUTO: 0.4 % — SIGNIFICANT CHANGE UP (ref 0–0.9)
INR BLD: 1.13 RATIO — SIGNIFICANT CHANGE UP (ref 0.85–1.18)
KETONES UR-MCNC: NEGATIVE MG/DL — SIGNIFICANT CHANGE UP
LEUKOCYTE ESTERASE UR-ACNC: NEGATIVE — SIGNIFICANT CHANGE UP
LYMPHOCYTES # BLD AUTO: 1.15 K/UL — SIGNIFICANT CHANGE UP (ref 1–3.3)
LYMPHOCYTES # BLD AUTO: 20.5 % — SIGNIFICANT CHANGE UP (ref 13–44)
MAGNESIUM SERPL-MCNC: 2.1 MG/DL — SIGNIFICANT CHANGE UP (ref 1.6–2.6)
MCHC RBC-ENTMCNC: 28.8 PG — SIGNIFICANT CHANGE UP (ref 27–34)
MCHC RBC-ENTMCNC: 33 GM/DL — SIGNIFICANT CHANGE UP (ref 32–36)
MCV RBC AUTO: 87.1 FL — SIGNIFICANT CHANGE UP (ref 80–100)
MONOCYTES # BLD AUTO: 0.62 K/UL — SIGNIFICANT CHANGE UP (ref 0–0.9)
MONOCYTES NFR BLD AUTO: 11.1 % — SIGNIFICANT CHANGE UP (ref 2–14)
NEUTROPHILS # BLD AUTO: 3.56 K/UL — SIGNIFICANT CHANGE UP (ref 1.8–7.4)
NEUTROPHILS NFR BLD AUTO: 63.5 % — SIGNIFICANT CHANGE UP (ref 43–77)
NITRITE UR-MCNC: NEGATIVE — SIGNIFICANT CHANGE UP
PH UR: 6 — SIGNIFICANT CHANGE UP (ref 5–8)
PLATELET # BLD AUTO: 142 K/UL — LOW (ref 150–400)
POTASSIUM SERPL-MCNC: 3.9 MMOL/L — SIGNIFICANT CHANGE UP (ref 3.5–5.3)
POTASSIUM SERPL-SCNC: 3.9 MMOL/L — SIGNIFICANT CHANGE UP (ref 3.5–5.3)
PROT SERPL-MCNC: 7 GM/DL — SIGNIFICANT CHANGE UP (ref 6–8.3)
PROT UR-MCNC: NEGATIVE MG/DL — SIGNIFICANT CHANGE UP
PROTHROM AB SERPL-ACNC: 12.7 SEC — SIGNIFICANT CHANGE UP (ref 9.5–13)
RBC # BLD: 5.11 M/UL — SIGNIFICANT CHANGE UP (ref 4.2–5.8)
RBC # FLD: 13.4 % — SIGNIFICANT CHANGE UP (ref 10.3–14.5)
SODIUM SERPL-SCNC: 142 MMOL/L — SIGNIFICANT CHANGE UP (ref 135–145)
SP GR SPEC: 1 — SIGNIFICANT CHANGE UP (ref 1–1.03)
TROPONIN I, HIGH SENSITIVITY RESULT: 24.01 NG/L — SIGNIFICANT CHANGE UP
UROBILINOGEN FLD QL: 1 MG/DL — SIGNIFICANT CHANGE UP (ref 0.2–1)
WBC # BLD: 5.6 K/UL — SIGNIFICANT CHANGE UP (ref 3.8–10.5)
WBC # FLD AUTO: 5.6 K/UL — SIGNIFICANT CHANGE UP (ref 3.8–10.5)

## 2024-08-22 PROCEDURE — 70450 CT HEAD/BRAIN W/O DYE: CPT | Mod: 26,MC

## 2024-08-22 PROCEDURE — 93010 ELECTROCARDIOGRAM REPORT: CPT

## 2024-08-22 PROCEDURE — 99285 EMERGENCY DEPT VISIT HI MDM: CPT | Mod: FS

## 2024-08-22 PROCEDURE — 71045 X-RAY EXAM CHEST 1 VIEW: CPT | Mod: 26

## 2024-08-22 RX ORDER — AMLODIPINE BESYLATE 10 MG/1
1 TABLET ORAL
Refills: 0 | DISCHARGE
Start: 2024-08-22

## 2024-08-22 NOTE — ED PROVIDER NOTE - PROGRESS NOTE DETAILS
JG: Saw patient concurrently with CELIA Carbajal.  Patient states for the past 2 months he has been adjusting medications for BP and started on eliquis for afib.  He has been getting intermittent dizziness while at work, after strenuous day, after yard work.  He noticed elevated BPs earlier of systolic 210/90s.  He takes metoprolol and ramipril and amlodipine was added for additional BP control recently.  Daughter states he constantly complains of lightheadedness and dizziness.  HR in ED has been in 40s.  Could be symptomatic Slow afib.  Will have cardiology see in AM and will admit to tele.

## 2024-08-22 NOTE — ED PROVIDER NOTE - OBJECTIVE STATEMENT
84-year-old male with a past medical history of sleep apnea, CAD, stents on aspirin 81 mg daily, status post triple CABG, high blood pressure, high cholesterol was brought in by ambulance for lightheadedness and high blood pressure.  Patient states that he noticed that around 12:30pm while he was working.  Patient thought it was because he was not staying hydrated throughout the day.  Patient states that it was progressively getting worse until 230 when he went to go  his other blood pressure medication that his doctor prescribed him for high blood pressure but has not taken it yet.  Dizziness is characterized as lightheaded/imbalance with an associated slight occipital headache.  Patient is usually on ramipril, metoprolol, and recently prescribed hydrochlorothiazide for his blood pressure.  Denies chest pain, shortness of breath, abdominal pain, nausea, vomiting, diarrhea.  Patient currently does not feel dizzy or have a headache. -Rod Carbajal PA-C 84-year-old male with a past medical history of sleep apnea, CAD, stents on aspirin 81 mg daily, status post triple CABG, high blood pressure, high cholesterol, recent dx of a fib on eliquis was brought in by ambulance for lightheadedness and high blood pressure.  Patient states that he noticed that around 12:30pm while he was working.  Patient thought it was because he was not staying hydrated throughout the day.  Patient states that it was progressively getting worse until 230 when he went to go  his other blood pressure medication that his doctor prescribed him for high blood pressure but has not taken it yet.  Dizziness is characterized as lightheaded/imbalance with an associated slight occipital headache.  Patient is usually on ramipril, metoprolol, and recently prescribed hydrochlorothiazide for his blood pressure.  Denies chest pain, shortness of breath, abdominal pain, nausea, vomiting, diarrhea.  Patient currently does not feel dizzy or have a headache. -Rod Carbajal PA-C

## 2024-08-22 NOTE — ED PROVIDER NOTE - CLINICAL SUMMARY MEDICAL DECISION MAKING FREE TEXT BOX
83 yo M with afib , lightheadedness, symptomatic bradycardia-slow afib.  Will admit to tele for cardiology evaluation.  Also with labile HTN.  EKG, CXR, cardiac labs, cardiac monitoring planned in ED.

## 2024-08-22 NOTE — ED PROVIDER NOTE - ATTENDING APP SHARED VISIT CONTRIBUTION OF CARE
Attending Contribution to Care: I, Kia Palafox, performed the initial face to face bedside interview with this patient regarding history of present illness, review of symptoms and relevant past medical, social and family history.  I completed an independent physical examination.  I was the initial provider who evaluated this patient. I have signed out the follow up of any pending tests (i.e. labs, radiological studies) to the ACP.  I have communicated the patient’s plan of care and disposition with the ACP.

## 2024-08-22 NOTE — ED PROVIDER NOTE - NSICDXPASTMEDICALHX_GEN_ALL_CORE_FT
PAST MEDICAL HISTORY:  CAD (coronary atherosclerotic disease) stents    HLD (hyperlipidemia)     HTN (hypertension)     Sleep apnea

## 2024-08-22 NOTE — ED PROVIDER NOTE - CARE PLAN
Principal Discharge DX:	Lightheaded  Secondary Diagnosis:	HTN (hypertension)   1 Principal Discharge DX:	Lightheaded  Secondary Diagnosis:	HTN (hypertension)  Secondary Diagnosis:	Symptomatic bradycardia  Secondary Diagnosis:	Atrial fibrillation with slow ventricular response

## 2024-08-23 ENCOUNTER — RESULT REVIEW (OUTPATIENT)
Age: 84
End: 2024-08-23

## 2024-08-23 DIAGNOSIS — I10 ESSENTIAL (PRIMARY) HYPERTENSION: ICD-10-CM

## 2024-08-23 DIAGNOSIS — I48.91 UNSPECIFIED ATRIAL FIBRILLATION: ICD-10-CM

## 2024-08-23 DIAGNOSIS — I25.10 ATHEROSCLEROTIC HEART DISEASE OF NATIVE CORONARY ARTERY WITHOUT ANGINA PECTORIS: ICD-10-CM

## 2024-08-23 DIAGNOSIS — G47.33 OBSTRUCTIVE SLEEP APNEA (ADULT) (PEDIATRIC): ICD-10-CM

## 2024-08-23 DIAGNOSIS — R42 DIZZINESS AND GIDDINESS: ICD-10-CM

## 2024-08-23 DIAGNOSIS — E78.5 HYPERLIPIDEMIA, UNSPECIFIED: ICD-10-CM

## 2024-08-23 DIAGNOSIS — Z29.9 ENCOUNTER FOR PROPHYLACTIC MEASURES, UNSPECIFIED: ICD-10-CM

## 2024-08-23 PROBLEM — G47.30 SLEEP APNEA, UNSPECIFIED: Chronic | Status: ACTIVE | Noted: 2022-06-30

## 2024-08-23 LAB
A1C WITH ESTIMATED AVERAGE GLUCOSE RESULT: 5.2 % — SIGNIFICANT CHANGE UP (ref 4–5.6)
ALBUMIN SERPL ELPH-MCNC: 3.5 G/DL — SIGNIFICANT CHANGE UP (ref 3.3–5)
ALP SERPL-CCNC: 47 U/L — SIGNIFICANT CHANGE UP (ref 40–120)
ALT FLD-CCNC: 25 U/L — SIGNIFICANT CHANGE UP (ref 12–78)
ANION GAP SERPL CALC-SCNC: 4 MMOL/L — LOW (ref 5–17)
AST SERPL-CCNC: 19 U/L — SIGNIFICANT CHANGE UP (ref 15–37)
BASOPHILS # BLD AUTO: 0.04 K/UL — SIGNIFICANT CHANGE UP (ref 0–0.2)
BASOPHILS NFR BLD AUTO: 0.8 % — SIGNIFICANT CHANGE UP (ref 0–2)
BILIRUB SERPL-MCNC: 1.8 MG/DL — HIGH (ref 0.2–1.2)
BUN SERPL-MCNC: 14 MG/DL — SIGNIFICANT CHANGE UP (ref 7–23)
CALCIUM SERPL-MCNC: 9.6 MG/DL — SIGNIFICANT CHANGE UP (ref 8.5–10.1)
CHLORIDE SERPL-SCNC: 109 MMOL/L — HIGH (ref 96–108)
CHOLEST SERPL-MCNC: 120 MG/DL — SIGNIFICANT CHANGE UP
CO2 SERPL-SCNC: 27 MMOL/L — SIGNIFICANT CHANGE UP (ref 22–31)
CREAT SERPL-MCNC: 0.91 MG/DL — SIGNIFICANT CHANGE UP (ref 0.5–1.3)
EGFR: 84 ML/MIN/1.73M2 — SIGNIFICANT CHANGE UP
EOSINOPHIL # BLD AUTO: 0.18 K/UL — SIGNIFICANT CHANGE UP (ref 0–0.5)
EOSINOPHIL NFR BLD AUTO: 3.8 % — SIGNIFICANT CHANGE UP (ref 0–6)
ESTIMATED AVERAGE GLUCOSE: 103 MG/DL — SIGNIFICANT CHANGE UP (ref 68–114)
GLUCOSE SERPL-MCNC: 106 MG/DL — HIGH (ref 70–99)
HCT VFR BLD CALC: 44.8 % — SIGNIFICANT CHANGE UP (ref 39–50)
HDLC SERPL-MCNC: 31 MG/DL — LOW
HGB BLD-MCNC: 15.1 G/DL — SIGNIFICANT CHANGE UP (ref 13–17)
IMM GRANULOCYTES NFR BLD AUTO: 0.2 % — SIGNIFICANT CHANGE UP (ref 0–0.9)
LIPID PNL WITH DIRECT LDL SERPL: 73 MG/DL — SIGNIFICANT CHANGE UP
LYMPHOCYTES # BLD AUTO: 1.14 K/UL — SIGNIFICANT CHANGE UP (ref 1–3.3)
LYMPHOCYTES # BLD AUTO: 24 % — SIGNIFICANT CHANGE UP (ref 13–44)
MAGNESIUM SERPL-MCNC: 2.1 MG/DL — SIGNIFICANT CHANGE UP (ref 1.6–2.6)
MCHC RBC-ENTMCNC: 29.4 PG — SIGNIFICANT CHANGE UP (ref 27–34)
MCHC RBC-ENTMCNC: 33.7 GM/DL — SIGNIFICANT CHANGE UP (ref 32–36)
MCV RBC AUTO: 87.3 FL — SIGNIFICANT CHANGE UP (ref 80–100)
MONOCYTES # BLD AUTO: 0.5 K/UL — SIGNIFICANT CHANGE UP (ref 0–0.9)
MONOCYTES NFR BLD AUTO: 10.5 % — SIGNIFICANT CHANGE UP (ref 2–14)
NEUTROPHILS # BLD AUTO: 2.88 K/UL — SIGNIFICANT CHANGE UP (ref 1.8–7.4)
NEUTROPHILS NFR BLD AUTO: 60.7 % — SIGNIFICANT CHANGE UP (ref 43–77)
NON HDL CHOLESTEROL: 90 MG/DL — SIGNIFICANT CHANGE UP
PHOSPHATE SERPL-MCNC: 3.4 MG/DL — SIGNIFICANT CHANGE UP (ref 2.5–4.5)
PLATELET # BLD AUTO: 153 K/UL — SIGNIFICANT CHANGE UP (ref 150–400)
POTASSIUM SERPL-MCNC: 4.2 MMOL/L — SIGNIFICANT CHANGE UP (ref 3.5–5.3)
POTASSIUM SERPL-SCNC: 4.2 MMOL/L — SIGNIFICANT CHANGE UP (ref 3.5–5.3)
PROT SERPL-MCNC: 7 GM/DL — SIGNIFICANT CHANGE UP (ref 6–8.3)
RBC # BLD: 5.13 M/UL — SIGNIFICANT CHANGE UP (ref 4.2–5.8)
RBC # FLD: 13.3 % — SIGNIFICANT CHANGE UP (ref 10.3–14.5)
SODIUM SERPL-SCNC: 140 MMOL/L — SIGNIFICANT CHANGE UP (ref 135–145)
TRIGL SERPL-MCNC: 85 MG/DL — SIGNIFICANT CHANGE UP
TROPONIN I, HIGH SENSITIVITY RESULT: 29.84 NG/L — SIGNIFICANT CHANGE UP
WBC # BLD: 4.75 K/UL — SIGNIFICANT CHANGE UP (ref 3.8–10.5)
WBC # FLD AUTO: 4.75 K/UL — SIGNIFICANT CHANGE UP (ref 3.8–10.5)

## 2024-08-23 PROCEDURE — 36415 COLL VENOUS BLD VENIPUNCTURE: CPT

## 2024-08-23 PROCEDURE — 83036 HEMOGLOBIN GLYCOSYLATED A1C: CPT

## 2024-08-23 PROCEDURE — 80061 LIPID PANEL: CPT

## 2024-08-23 PROCEDURE — 93005 ELECTROCARDIOGRAM TRACING: CPT

## 2024-08-23 PROCEDURE — 83735 ASSAY OF MAGNESIUM: CPT

## 2024-08-23 PROCEDURE — 84484 ASSAY OF TROPONIN QUANT: CPT

## 2024-08-23 PROCEDURE — 76376 3D RENDER W/INTRP POSTPROCES: CPT | Mod: 26

## 2024-08-23 PROCEDURE — 99223 1ST HOSP IP/OBS HIGH 75: CPT

## 2024-08-23 PROCEDURE — 80053 COMPREHEN METABOLIC PANEL: CPT

## 2024-08-23 PROCEDURE — 80048 BASIC METABOLIC PNL TOTAL CA: CPT

## 2024-08-23 PROCEDURE — 93306 TTE W/DOPPLER COMPLETE: CPT | Mod: 26

## 2024-08-23 PROCEDURE — 84100 ASSAY OF PHOSPHORUS: CPT

## 2024-08-23 PROCEDURE — 85025 COMPLETE CBC W/AUTO DIFF WBC: CPT

## 2024-08-23 RX ORDER — TAMSULOSIN HYDROCHLORIDE 0.4 MG/1
2 CAPSULE ORAL
Refills: 0 | DISCHARGE

## 2024-08-23 RX ORDER — ONDANSETRON 2 MG/ML
4 INJECTION, SOLUTION INTRAMUSCULAR; INTRAVENOUS EVERY 8 HOURS
Refills: 0 | Status: DISCONTINUED | OUTPATIENT
Start: 2024-08-23 | End: 2024-08-26

## 2024-08-23 RX ORDER — APIXABAN 5 MG/1
5 TABLET, FILM COATED ORAL EVERY 12 HOURS
Refills: 0 | Status: DISCONTINUED | OUTPATIENT
Start: 2024-08-23 | End: 2024-08-26

## 2024-08-23 RX ORDER — TAMSULOSIN HYDROCHLORIDE 0.4 MG/1
0.4 CAPSULE ORAL AT BEDTIME
Refills: 0 | Status: DISCONTINUED | OUTPATIENT
Start: 2024-08-23 | End: 2024-08-23

## 2024-08-23 RX ORDER — HEPARIN SODIUM,BOVINE 1000/ML
5000 VIAL (ML) INJECTION EVERY 12 HOURS
Refills: 0 | Status: DISCONTINUED | OUTPATIENT
Start: 2024-08-23 | End: 2024-08-23

## 2024-08-23 RX ORDER — MAGNESIUM, ALUMINUM HYDROXIDE 200-225/5
30 SUSPENSION, ORAL (FINAL DOSE FORM) ORAL EVERY 4 HOURS
Refills: 0 | Status: DISCONTINUED | OUTPATIENT
Start: 2024-08-23 | End: 2024-08-26

## 2024-08-23 RX ORDER — TAMSULOSIN HYDROCHLORIDE 0.4 MG/1
0.8 CAPSULE ORAL AT BEDTIME
Refills: 0 | Status: DISCONTINUED | OUTPATIENT
Start: 2024-08-23 | End: 2024-08-26

## 2024-08-23 RX ORDER — APIXABAN 5 MG/1
1 TABLET, FILM COATED ORAL
Refills: 0 | DISCHARGE

## 2024-08-23 RX ORDER — ASPIRIN 81 MG
81 TABLET, DELAYED RELEASE (ENTERIC COATED) ORAL DAILY
Refills: 0 | Status: DISCONTINUED | OUTPATIENT
Start: 2024-08-23 | End: 2024-08-26

## 2024-08-23 RX ORDER — LISINOPRIL 10 MG/1
20 TABLET ORAL DAILY
Refills: 0 | Status: DISCONTINUED | OUTPATIENT
Start: 2024-08-23 | End: 2024-08-26

## 2024-08-23 RX ORDER — ACETAMINOPHEN 325 MG/1
650 TABLET ORAL EVERY 6 HOURS
Refills: 0 | Status: DISCONTINUED | OUTPATIENT
Start: 2024-08-23 | End: 2024-08-26

## 2024-08-23 RX ADMIN — LISINOPRIL 20 MILLIGRAM(S): 10 TABLET ORAL at 09:27

## 2024-08-23 RX ADMIN — APIXABAN 5 MILLIGRAM(S): 5 TABLET, FILM COATED ORAL at 21:46

## 2024-08-23 RX ADMIN — Medication 10 MILLIGRAM(S): at 21:45

## 2024-08-23 RX ADMIN — TAMSULOSIN HYDROCHLORIDE 0.4 MILLIGRAM(S): 0.4 CAPSULE ORAL at 22:10

## 2024-08-23 RX ADMIN — Medication 81 MILLIGRAM(S): at 09:27

## 2024-08-23 RX ADMIN — APIXABAN 5 MILLIGRAM(S): 5 TABLET, FILM COATED ORAL at 09:27

## 2024-08-23 NOTE — PHARMACOTHERAPY INTERVENTION NOTE - COMMENTS
Medication reconciliation completed.  Reviewed Medication list and confirmed med allergies with patient and wife; confirmed with Dr. First Medx.  Pt had been taking Metoprolol Succ 25mg Sprinkle caps, dc'd and prescribed Labetalol but pt did not want to take, so pt prescribed Amlodipine 5mg.

## 2024-08-23 NOTE — CONSULT NOTE ADULT - ASSESSMENT
ASSESSMENT & PLAN: 84 year old male with above history with persistent atrial flutter with slow ventricular response.        AFib/flutter with slow VR while on beta blocker  Continue telemonitoring for any worsening bradycardia or high degree AV block  Avoid any further AV mile blockers  Discussed with patient that if he continues to have symptomatic bradycardia off of AV mile blockers pacemaker may be indicated  Chads Vasc 4, continue Eliquis uninterrupted  Zio AT to be placed prior to discharge  Patient will follow up with Dr. Iglesias at [facility]   Further management and dispo per medicine  Plan discussed with patient and Dr. Tomlinson
84-year-old male with a past medical history of ALISSON on CPAP, CAD s/p PCI with stents, CABG, HTN/HLD, Afib on eliquis presented with complaints of lightheadedness and high blood pressure associated with headache for 1 day. Patient has been adjusting BP medications for the past months. Reports episode of lightheadedness a month ago in [place], his pulse was found to be in 40s, metoprolol decreased from 50 mg qd to 25 mg qd. Patient recently diagnosed with Afib and started on eliquis. Yesterday after work had an episode of lightheadedness, initially mild, but worsened through the day. When he checked his BP it was 210s/90s which prompted him to come to [facility]. Denies N/V, chest pain, palpitations, SOB, falls, diaphoresis. No other acute complaints and ROS otherwise benign.   In ED vitals noted for HR 41, /57, otherwise stable. CBC wnl, CMP noted for T. bili 1.7, otherwise unremarkable. Trops neg x2, ECG HR 59, aFib w/ PVCs. UA sterile. CTH neg for acute bleed, see full study below. Admitted for lightheadedness   8/23- feels well this morning tele shows AFIB 69 was on metoprolol as oupt , deneis CP or SOB   just had recent stress test that was negative for sig ischemia  EF 50-55% , echo EF 50-55% with mild MR AR and LAE   Doubt bradyacrdia caused lighthedeness no sustained HRs less than 40 on tele and now improved with holding BBlocker   1) DC metoprolol , cont lisinopril increase to 20 mg daily , add amlodipine 5 mg daily ( ha was started on this recently in my office )   if BP and HR stable can DC home later today , I will see him in my office within 1 week and will likely place a long term monitor on him

## 2024-08-23 NOTE — PATIENT PROFILE ADULT - FUNCTIONAL ASSESSMENT - BASIC MOBILITY 3.
Problem: Discharge Planning  Goal: Discharge to home or other facility with appropriate resources  Outcome: Progressing     Problem: Pain  Goal: Verbalizes/displays adequate comfort level or baseline comfort level  Outcome: Progressing     Problem: Chronic Conditions and Co-morbidities  Goal: Patient's chronic conditions and co-morbidity symptoms are monitored and maintained or improved  Outcome: Progressing     Problem: Skin/Tissue Integrity  Goal: Absence of new skin breakdown  Description: 1. Monitor for areas of redness and/or skin breakdown  2. Assess vascular access sites hourly  3. Every 4-6 hours minimum:  Change oxygen saturation probe site  4. Every 4-6 hours:  If on nasal continuous positive airway pressure, respiratory therapy assess nares and determine need for appliance change or resting period.   Outcome: Progressing     Problem: ABCDS Injury Assessment  Goal: Absence of physical injury  Outcome: Progressing 4 = No assist / stand by assistance

## 2024-08-23 NOTE — ED ADULT NURSE NOTE - CHIEF COMPLAINT
Refill see below with a 90 day patient also requesting pantoprazole be changed from 1 tablet once a day to 1 tablet twice a day. The patient is a 84y Male complaining of hypertension.

## 2024-08-23 NOTE — CHART NOTE - NSCHARTNOTEFT_GEN_A_CORE
slow Afib with lowest HR 27  asymptomatic, longest pause overnight was 4.5sec  current asymptomatic   EP and cardio consults appreciated   AV mile bloockers  held - monitor on tele     84M admitted for symptomatic slow afib.       Problem/Plan - 1:  ·  Problem: Atrial fibrillation with slow ventricular response.   ·  Plan: ECHO in am  F/u a1c and lipid panel.   Monitor on tele  Hold AV mile blockers  Cardio consult   Fall precautions.    Problem/Plan - 2:  ·  Problem: CAD (coronary artery disease).   ·  Plan: C/w aspirin and statin.    Problem/Plan - 3:  ·  Problem: HTN (hypertension).   ·  Plan: C/w enalapril therapeutic equivalent - lisinopril  Monitor BP and titrate meds for target BP.    Problem/Plan - 4:  ·  Problem: HLD (hyperlipidemia).   ·  Plan: Statin.    Problem/Plan - 5:  ·  Problem: Obstructive sleep apnea.   ·  Plan: C/w CPAP.    Problem/Plan - 6:  ·  Problem: Prophylactic measure.   ·  Plan: DVT ppx: eliquis  Fall precautions.

## 2024-08-23 NOTE — ED ADULT NURSE NOTE - OBJECTIVE STATEMENT
Pt BIBEMS c/o worsening lightheadedness and HTN.  Pt reports he noticed symptoms around 12:30pm while he was working.  Pt thought it was because he was not staying hydrated. Pt is usually on ramipril, metoprolol, and recently prescribed hydrochlorothiazide for his blood pressure. Pt also recently dx w/ AFib and is on Eliquis. Pt denies CP, SOB, n/v/d, or any other complaints. Pt is awake and alert, a&ox4, ambulatory, no s/s of acute distress noted at this time.

## 2024-08-23 NOTE — ED ADULT NURSE NOTE - NSFALLOOBATTEMPT_ED_ALL_ED
"Cc:  Pregnancy follow up.  GBS is negative. Patient c/o not feeling well.  She feels \"stressed\" today due to her living arrangements.  She denies headaches or chest pain.  She reports some seeing spots and swelling in her ankles.    Her repeat BP was 142/82.  Gen - alert and pleasant.  Abdomen - gravid, nontender.  Cervix - 70/3/-2  CBC, CMP and protein/creatinine ratio ordered.  A/P:  IUP at 36 weeks with AMA, suboxone use, SGA and PIH  - AMA.  Patient did not complete glucola.  Will check HgbA1c today.  - Suboxone.  Continue maintenance.  - SGA.  Stable.  - PIH.  Elevated BP.  Check BP in 48 to 72 hours.  Labs today.  Likely delivery at 37 weeks.      "
No

## 2024-08-23 NOTE — ED ADULT NURSE NOTE - NSFALLUNIVINTERV_ED_ALL_ED
Bed/Stretcher in lowest position, wheels locked, appropriate side rails in place/Call bell, personal items and telephone in reach/Instruct patient to call for assistance before getting out of bed/chair/stretcher/Non-slip footwear applied when patient is off stretcher/[place] to call system/Physically safe environment - no spills, clutter or unnecessary equipment/Purposeful proactive rounding/Room/bathroom lighting operational, light cord in reach

## 2024-08-23 NOTE — H&P ADULT - NSHPLABSRESULTS_GEN_ALL_CORE
LABS:  cret                        14.7   5.60  )-----------( 142      ( 22 Aug 2024 22:13 )             44.5     08-22    142  |  110<H>  |  16  ----------------------------<  106<H>  3.9   |  27  |  0.98    Ca    9.9      22 Aug 2024 22:13  Mg     2.1     08-22    TPro  7.0  /  Alb  3.5  /  TBili  1.7<H>  /  DBili  x   /  AST  18  /  ALT  24  /  AlkPhos  52  08-22    PT/INR - ( 22 Aug 2024 22:35 )   PT: 12.7 sec;   INR: 1.13 ratio      Urinalysis with Rflx Culture (collected 08-22-24 @ 22:15)    < from: CT Head No Cont (08.22.24 @ 22:52) >    IMPRESSION:    No acute intracranial hemorrhage, mass effect, or CT evidence of a large   vascular territory infarct.    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    If there are new or persistent symptoms, consider further evaluation with   MRI provided no contraindications.    --- End of Report ---    < end of copied text >

## 2024-08-23 NOTE — H&P ADULT - PROBLEM SELECTOR PLAN 1
ECHO in am  F/u a1c and lipid panel.   Monitor on tele  Hold AV mile blockers  Cardio consult   Fall precautions.

## 2024-08-23 NOTE — PATIENT PROFILE ADULT - FUNCTIONAL ASSESSMENT - DAILY ACTIVITY SECTION LABEL
1. I was told the name of the physician that took care of my child while in the hospital.    2. I have been told about any important findings on my child's physical exam and my child's plan of care.    3. The doctor clearly explained my child's diagnosis and other possible diagnoses that were considered.    4. My child's doctor explained all the tests that were done and their results (if available). I understand that some of the test results may not be ready before we go home and I was told how I can get these results. I understand that a summary of my child's hospitalization and important test results will be shared with my child's outpatient doctor.    5. My child's doctor talked to me about what I need to do when we go home.    6. I understand what signs and symptoms to watch for. I understand what symptoms I would need to call my doctor for and/or return to the hospital.    7. I have the phone number to call the hospital for results and/or questions after I leave the hospital. .

## 2024-08-23 NOTE — PATIENT PROFILE ADULT - FALL HARM RISK - HARM RISK INTERVENTIONS

## 2024-08-23 NOTE — H&P ADULT - HISTORY OF PRESENT ILLNESS
84-year-old male with a past medical history of ALISSON on CPAP, CAD s/p PCI with stents, CABG, HTN/HLD, Afib on eliquis presented with complaints of lightheadedness and high blood pressure associated with headache for 1 day. Patient has been adjusting BP medications for the past months. Reports episode of lightheadedness a month ago in [place], his pulse was found to be in 40s, metoprolol decreased from 50 mg qd to 25 mg qd. Patient recently diagnosed with Afib and started on eliquis. Yesterday after work had an episode of lightheadedness, initially mild, but worsened through the day. When he checked his BP it was 210s/90s which prompted him to come to [facility]. Denies N/V, chest pain, palpitations, SOB, falls, diaphoresis. No other acute complaints and ROS otherwise benign.   In ED vitals noted for HR 41, /57, otherwise stable. CBC wnl, CMP noted for T. bili 1.7, otherwise unremarkable. Trops neg x2, ECG HR 59, aFib w/ PVCs. UA sterile. CTH neg for acute bleed, see full study below. Admitted for lightheadedness possibly 2/2 symptomatic slow afib, and cardiology evaluation.

## 2024-08-23 NOTE — H&P ADULT - NSHPPHYSICALEXAM_GEN_ALL_CORE
T(C): 36.5 (08-23-24 @ 03:47), Max: 36.6 (08-22-24 @ 23:44)  HR: 55 (08-23-24 @ 03:47) (41 - 55)  BP: 161/71 (08-23-24 @ 03:47) (138/67 - 169/57)  RR: 17 (08-23-24 @ 03:47) (16 - 17)  SpO2: 98% (08-23-24 @ 03:47) (98% - 100%)    General: non-toxic  HEENT: non-traumatic, perrla, eomi  Cardio: slow HR  Lungs: comfortable breathing, clear to auscultation  Abdomen: Soft, non-tender, non-distended  Neuro: AOx4  Ext: Pulses +2

## 2024-08-23 NOTE — CONSULT NOTE ADULT - SUBJECTIVE AND OBJECTIVE BOX
Patient is a 84y old  Male who presents with a chief complaint of     HPI:  84-year-old male with a past medical history of ALISSON on CPAP, CAD s/p PCI with stents, CABG, HTN/HLD, Afib on eliquis presented with complaints of lightheadedness and high blood pressure associated with headache for 1 day. Patient has been adjusting BP medications for the past months. Reports episode of lightheadedness a month ago in [place], his pulse was found to be in 40s, metoprolol decreased from 50 mg qd to 25 mg qd. Patient recently diagnosed with Afib and started on eliquis. Yesterday after work had an episode of lightheadedness, initially mild, but worsened through the day. When he checked his BP it was 210s/90s which prompted him to come to [facility]. Denies N/V, chest pain, palpitations, SOB, falls, diaphoresis. No other acute complaints and ROS otherwise benign.   In ED vitals noted for HR 41, /57, otherwise stable. CBC wnl, CMP noted for T. bili 1.7, otherwise unremarkable. Trops neg x2, ECG HR 59, aFib w/ PVCs. UA sterile. CTH neg for acute bleed, see full study below. Admitted for lightheadedness   8/23- feels well this morning tele shows AFIB 69 was on metoprolol as oupt , deneis CP or SOB   just had recent stress test that was negative for sig ischemia  EF 50-55% , echo EF 50-55% with mild MR AR and LAE   PAST MEDICAL & SURGICAL HISTORY:  HTN (hypertension)      HLD (hyperlipidemia)      CAD (coronary atherosclerotic disease)  stents      Sleep apnea      S/P CABG (coronary artery bypass graft)  x3                                        MEDICATIONS  (STANDING):  apixaban 5 milliGRAM(s) Oral every 12 hours  aspirin enteric coated 81 milliGRAM(s) Oral daily  atorvastatin 10 milliGRAM(s) Oral at bedtime  lisinopril 20 milliGRAM(s) Oral daily    MEDICATIONS  (PRN):  acetaminophen     Tablet .. 650 milliGRAM(s) Oral every 6 hours PRN Mild Pain (1 - 3)  aluminum hydroxide/magnesium hydroxide/simethicone Suspension 30 milliLiter(s) Oral every 4 hours PRN Dyspepsia  melatonin 3 milliGRAM(s) Oral at bedtime PRN Insomnia  ondansetron Injectable 4 milliGRAM(s) IV Push every 8 hours PRN Nausea and/or Vomiting      FAMILY HISTORY:  Family history of Hodgkin's lymphoma (Father)        SOCIAL HISTORY:    CIGARETTES:        Vital Signs Last 24 Hrs  T(C): 36.4 (23 Aug 2024 08:10), Max: 36.6 (22 Aug 2024 23:44)  T(F): 97.5 (23 Aug 2024 08:10), Max: 97.8 (22 Aug 2024 23:44)  HR: 52 (23 Aug 2024 08:10) (41 - 55)  BP: 153/67 (23 Aug 2024 08:10) (138/67 - 169/57)  BP(mean): 86 (23 Aug 2024 03:28) (86 - 95)  RR: 16 (23 Aug 2024 08:10) (16 - 17)  SpO2: 99% (23 Aug 2024 08:10) (98% - 100%)    Parameters below as of 23 Aug 2024 08:10  Patient On (Oxygen Delivery Method): room air                INTERPRETATION OF TELEMETRY:    ECG:    I&O's Detail      LABS:                        15.1   4.75  )-----------( 153      ( 23 Aug 2024 07:06 )             44.8     08-23    140  |  109<H>  |  14  ----------------------------<  106<H>  4.2   |  27  |  0.91    Ca    9.6      23 Aug 2024 07:06  Phos  3.4     08-23  Mg     2.1     08-23    TPro  7.0  /  Alb  3.5  /  TBili  1.8<H>  /  DBili  x   /  AST  19  /  ALT  25  /  AlkPhos  47  08-23        PT/INR - ( 22 Aug 2024 22:35 )   PT: 12.7 sec;   INR: 1.13 ratio           Urinalysis Basic - ( 23 Aug 2024 07:06 )    Color: x / Appearance: x / SG: x / pH: x  Gluc: 106 mg/dL / Ketone: x  / Bili: x / Urobili: x   Blood: x / Protein: x / Nitrite: x   Leuk Esterase: x / RBC: x / WBC x   Sq Epi: x / Non Sq Epi: x / Bacteria: x      I&O's Summary    BNP  RADIOLOGY & ADDITIONAL STUDIES:
HPI:  84 year old male with history of ALISSON on CPAP, CAD s/p PCI/CABG, HTN/HLD, Persistent Aflutter on Eliquis and Metoprolol.  Patient presented with symptoms of lightheadedness and hypertension with headache.  Patient reports that he was noted to be bradycardic last month by MD in [place] and Metoprolol dose was cut in half. Patient was seen by EP, Dr. Iglesias, in June and according to office note patient wore outpatient cardiac monitor that revealed persistent atypical atrial flutter with some episodes of bradycardia and pauses mostly nocturnal up to 4 seconds, NSVT.  Patient underwent stress test in May 2024 which small abnormality c/w prior  of RCA.  Patient noted to have bradycardia on telemetry with rate at 30bpm briefly.  EP asked to evaluate for Aflutter with bradycardia.    PAST MEDICAL & SURGICAL HISTORY:  HTN (hypertension)  HLD (hyperlipidemia)  CAD (coronary atherosclerotic disease)  stents  Sleep apnea  S/P CABG (coronary artery bypass graft)  x3    MEDICATIONS  (STANDING):  apixaban 5 milliGRAM(s) Oral every 12 hours  aspirin enteric coated 81 milliGRAM(s) Oral daily  atorvastatin 10 milliGRAM(s) Oral at bedtime  lisinopril 20 milliGRAM(s) Oral daily    MEDICATIONS  (PRN):  acetaminophen     Tablet .. 650 milliGRAM(s) Oral every 6 hours PRN Mild Pain (1 - 3)  aluminum hydroxide/magnesium hydroxide/simethicone Suspension 30 milliLiter(s) Oral every 4 hours PRN Dyspepsia  melatonin 3 milliGRAM(s) Oral at bedtime PRN Insomnia  ondansetron Injectable 4 milliGRAM(s) IV Push every 8 hours PRN Nausea and/or Vomiting      Allergies    No Known Allergies      SOCIAL HISTORY: Denies tobacco, etoh abuse or illicit drug use    FAMILY HISTORY:  Family history of Hodgkin's lymphoma (Father)        Vital Signs Last 24 Hrs  T(C): 36.4 (23 Aug 2024 08:10), Max: 36.6 (22 Aug 2024 23:44)  T(F): 97.5 (23 Aug 2024 08:10), Max: 97.8 (22 Aug 2024 23:44)  HR: 52 (23 Aug 2024 08:10) (41 - 55)  BP: 153/67 (23 Aug 2024 08:10) (138/67 - 169/57)  BP(mean): 86 (23 Aug 2024 03:28) (86 - 95)  RR: 16 (23 Aug 2024 08:10) (16 - 17)  SpO2: 99% (23 Aug 2024 08:10) (98% - 100%)    Parameters below as of 23 Aug 2024 08:10  Patient On (Oxygen Delivery Method): room air        REVIEW OF SYSTEMS:    CONSTITUTIONAL:  As per HPI.  HEENT:  Eyes:  No diplopia or blurred vision. ENT:  No earache, sore throat or runny nose.  CARDIOVASCULAR:  No pressure, squeezing, strangling, tightness, heaviness or aching about the chest, neck, axilla or epigastrium.  RESPIRATORY:  No cough, shortness of breath, PND or orthopnea.  GASTROINTESTINAL:  No nausea, vomiting or diarrhea.  GENITOURINARY:  No dysuria, frequency or urgency.  MUSCULOSKELETAL:  As per HPI.  SKIN:  No change in skin, hair or nails.  NEUROLOGIC:  No paresthesias, fasciculations, seizures or weakness.  PSYCHIATRIC:  No disorder of thought or mood.  ENDOCRINE:  No heat or cold intolerance, polyuria or polydipsia.  HEMATOLOGICAL:  No easy bruising or bleedings:  .     PHYSICAL EXAMINATION:    GENERAL APPEARANCE:  Pt. is not currently dyspneic, in no distress. Pt. is alert, oriented, and pleasant.  HEENT:  Pupils are normal and react normally. No icterus. Mucous membranes well colored.  NECK:  Supple. No lymphadenopathy. Jugular venous pressure not elevated. Carotids equal.   HEART: Irregular There are no murmurs, rubs or gallops noted  CHEST:  Chest is clear to auscultation. Normal respiratory effort.  ABDOMEN:  Soft and nontender.   EXTREMITIES:  There is no edema.   SKIN:  No rash or significant lesions are noted.    I&O's Summary      LABS:                        15.1   4.75  )-----------( 153      ( 23 Aug 2024 07:06 )             44.8     08-23    140  |  109<H>  |  14  ----------------------------<  106<H>  4.2   |  27  |  0.91    Ca    9.6      23 Aug 2024 07:06  Phos  3.4     08-23  Mg     2.1     08-23    TPro  7.0  /  Alb  3.5  /  TBili  1.8<H>  /  DBili  x   /  AST  19  /  ALT  25  /  AlkPhos  47  08-23    LIVER FUNCTIONS - ( 23 Aug 2024 07:06 )  Alb: 3.5 g/dL / Pro: 7.0 gm/dL / ALK PHOS: 47 U/L / ALT: 25 U/L / AST: 19 U/L / GGT: x           PT/INR - ( 22 Aug 2024 22:35 )   PT: 12.7 sec;   INR: 1.13 ratio         Urinalysis Basic - ( 23 Aug 2024 07:06 )    Color: x / Appearance: x / SG: x / pH: x  Gluc: 106 mg/dL / Ketone: x  / Bili: x / Urobili: x   Blood: x / Protein: x / Nitrite: x   Leuk Esterase: x / RBC: x / WBC x   Sq Epi: x / Non Sq Epi: x / Bacteria: x      EKG: Aflutter@66bpm  QRS: 110ms  QT/QTc: 458/480ms    TELEMETRY: Aflutter 30- 50s, pauses up to 4 seconds(nocturnal)    CARDIAC TESTS:  Pt. Name:       TONE STAFFORD Study Date:    8/23/2024  MRN:            [id number]      YOB: 1940  Accession #:    [id number]     Age:           84 years  Account#:       [id number]  Gender:        M  Heart Rate:                   Height:        67.00 in (170.18 cm)  Rhythm:                       Weight:        224.00 lb (101.61 kg)  Blood Pressure: 161/71 mmHg   BSA/BMI:       2.12 m² / 35.08 kg/m²  ________________________________________________________________________________________  Referring Physician:    [id number] Narayan Neal  Interpreting Physician: Curtis Lezama MD  Primary Sonographer:    Julio Allen RD    CPT:               3D RECONST W/O WKSTATION - 55605.m;ECHO TTE WO CON COMP W                     DOPP - 84990.m  Indication(s):     Abnormal electrocardiogram ECG/EKG - R94.31  Procedure:         Transthoracic echocardiogram with 2-D, M-mode and complete                     spectral and color flow Doppler. Real time and full volume                     3-dimensional imaging performed at the echo machine.  Ordering Location:   Admission Status:  Inpatient  Study Information: Image quality for this study is adequate.    _______________________________________________________________________________________     CONCLUSIONS:      1. Left ventricular systolic function is low normal with an ejection fraction of 51 % by 3D.   2. Mild mitral regurgitation.   3. No pericardial effusion seen.   4. Mild to moderate tricuspid regurgitation.   5. Estimated pulmonary artery systolic pressure is 28 mmHg.   6. Mild aortic regurgitation.      RADIOLOGY & ADDITIONAL STUDIES:

## 2024-08-23 NOTE — PATIENT PROFILE ADULT - FALL HARM RISK - TYPE OF ASSESSMENT
You were evaluated for zofran and bentyl .  Based on your work-up it was deemed that she was stable for discharge.  Please  your medication of bentyl and zofran which was prescribed to you.  Please follow-up with your primary care physician if you have any further concerns and go over your work-up.  If you experience any chest pain, shortness of breath, worsening abdominal pain, vomiting blood, worsening headache, seizures, or any worsening of your symptoms please return to the emergency department immediately.  If you have any pending results or any further questions please contact the emergency department at [phone].    
chelsey Martínez
Admission

## 2024-08-24 LAB
ANION GAP SERPL CALC-SCNC: 5 MMOL/L — SIGNIFICANT CHANGE UP (ref 5–17)
BUN SERPL-MCNC: 14 MG/DL — SIGNIFICANT CHANGE UP (ref 7–23)
CALCIUM SERPL-MCNC: 9.9 MG/DL — SIGNIFICANT CHANGE UP (ref 8.5–10.1)
CHLORIDE SERPL-SCNC: 108 MMOL/L — SIGNIFICANT CHANGE UP (ref 96–108)
CO2 SERPL-SCNC: 24 MMOL/L — SIGNIFICANT CHANGE UP (ref 22–31)
CREAT SERPL-MCNC: 0.89 MG/DL — SIGNIFICANT CHANGE UP (ref 0.5–1.3)
EGFR: 85 ML/MIN/1.73M2 — SIGNIFICANT CHANGE UP
GLUCOSE SERPL-MCNC: 101 MG/DL — HIGH (ref 70–99)
MAGNESIUM SERPL-MCNC: 2.1 MG/DL — SIGNIFICANT CHANGE UP (ref 1.6–2.6)
POTASSIUM SERPL-MCNC: 4.1 MMOL/L — SIGNIFICANT CHANGE UP (ref 3.5–5.3)
POTASSIUM SERPL-SCNC: 4.1 MMOL/L — SIGNIFICANT CHANGE UP (ref 3.5–5.3)
SODIUM SERPL-SCNC: 137 MMOL/L — SIGNIFICANT CHANGE UP (ref 135–145)

## 2024-08-24 PROCEDURE — 99232 SBSQ HOSP IP/OBS MODERATE 35: CPT

## 2024-08-24 RX ADMIN — LISINOPRIL 20 MILLIGRAM(S): 10 TABLET ORAL at 10:02

## 2024-08-24 RX ADMIN — TAMSULOSIN HYDROCHLORIDE 0.8 MILLIGRAM(S): 0.4 CAPSULE ORAL at 22:17

## 2024-08-24 RX ADMIN — Medication 10 MILLIGRAM(S): at 22:17

## 2024-08-24 RX ADMIN — Medication 81 MILLIGRAM(S): at 10:01

## 2024-08-24 RX ADMIN — APIXABAN 5 MILLIGRAM(S): 5 TABLET, FILM COATED ORAL at 22:16

## 2024-08-24 RX ADMIN — APIXABAN 5 MILLIGRAM(S): 5 TABLET, FILM COATED ORAL at 10:01

## 2024-08-24 NOTE — PROGRESS NOTE ADULT - ASSESSMENT
84M admitted for symptomatic slow afib.       Problem/Plan - 1:  ·  Problem: Atrial fibrillation with slow ventricular response.   ·  Plan: ECHO   F/u a1c and lipid panel.   Monitor on tele  Hold AV mile blockers  Cardio consult   Fall precautions.    Problem/Plan - 2:  ·  Problem: CAD (coronary artery disease).   ·  Plan: C/w aspirin and statin.    Problem/Plan - 3:  ·  Problem: HTN (hypertension).   ·  Plan: C/w enalapril therapeutic equivalent - lisinopril  Monitor BP and titrate meds for target BP.    Problem/Plan - 4:  ·  Problem: HLD (hyperlipidemia).   ·  Plan: Statin.    Problem/Plan - 5:  ·  Problem: Obstructive sleep apnea.   ·  Plan: C/w CPAP.    Problem/Plan - 6:  ·  Problem: Prophylactic measure.   ·  Plan: DVT ppx: eliquis  Fall precautions.      dispo- dw cardio slow afib improving HR 40-50 asymptomatic, plan to monitor tele fo 1 more day per Dr bravo

## 2024-08-24 NOTE — PROGRESS NOTE ADULT - SUBJECTIVE AND OBJECTIVE BOX
84-year-old male with a past medical history of ALISSON on CPAP, CAD s/p PCI with stents, CABG, HTN/HLD, Afib on eliquis presented with complaints of lightheadedness and high blood pressure associated with headache for 1 day. Patient has been adjusting BP medications for the past months. Reports episode of lightheadedness a month ago in [place], his pulse was found to be in 40s, metoprolol decreased from 50 mg qd to 25 mg qd. Patient recently diagnosed with Afib and started on eliquis. Yesterday after work had an episode of lightheadedness, initially mild, but worsened through the day. When he checked his BP it was 210s/90s which prompted him to come to [facility]. Denies N/V, chest pain, palpitations, SOB, falls, diaphoresis. No other acute complaints and ROS otherwise benign.   In ED vitals noted for HR 41, /57, otherwise stable. CBC wnl, CMP noted for T. bili 1.7, otherwise unremarkable. Trops neg x2, ECG HR 59, aFib w/ PVCs. UA sterile. CTH neg for acute bleed, see full study below. Admitted for lightheadedness possibly 2/2 symptomatic slow afib, and cardiology evaluation.     General: non-toxic  HEENT: non-traumatic, perrla, eomi  Cardio: slow HR  Lungs: comfortable breathing, clear to auscultation  Abdomen: Soft, non-tender, non-distended  Neuro: AOx4  Ext: Pulses +2< from: CT Head No Cont (08.22.24 @ 22:52) >    labs     IMPRESSION:    No acute intracranial hemorrhage, mass effect, or CT evidence of a large   vascular territory infarct.    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    If there are new or persistent symptoms, consider further evaluation with   MRI provided no contraindications.

## 2024-08-24 NOTE — PROGRESS NOTE ADULT - ASSESSMENT
Assessment and Recommendation:   · Assessment	  ASSESSMENT & PLAN: 84 year old male with above history with persistent atrial flutter with slow ventricular response.    Had nocturnal pauses (1.3 sec) asymptomatic at 1 and 3am HR during the day 70-80s  AFib/flutter with slow VR while on beta blocker-now discontinued  Continue telemonitoring for any worsening bradycardia or high degree AV block  Avoid any further AV mile blockers  Discussed with patient that if he continues to have symptomatic bradycardia off of AV mile blockers pacemaker may be indicated  Chads Vasc 4, continue Eliquis uninterrupted  Zio AT to be placed prior to discharge  Patient will follow up with Dr. Iglesias at [facility]   Further management and dispo per medicine Assessment and Recommendation:   · Assessment	  ASSESSMENT & PLAN: 84 year old male with above history with persistent atrial flutter with slow ventricular response.    Had nocturnal pauses (1.3 sec) asymptomatic at 1 and 3am ( not utilizing CPAP machine)  HR during the day 70-80s-wife to bring in his own CPAP today-encouraged pt to wear it tonight.  AFib/flutter with slow VR while on beta blocker-now discontinued  Continue telemonitoring for any worsening bradycardia or high degree AV block  Avoid any further AV mile blockers  Discussed with patient that if he continues to have symptomatic bradycardia off of AV mile blockers pacemaker may be indicated  Chads Vasc 4, continue Eliquis uninterrupted  Zio AT to be placed prior to discharge  Patient will follow up with Dr. Iglesias at [facility]   Further management and dispo per medicine

## 2024-08-24 NOTE — PROGRESS NOTE ADULT - SUBJECTIVE AND OBJECTIVE BOX
ELECTROPHYSIOLOGY   PROGRESS NOTE    Reason for follow up: AF with slow VR while on BB  Overnight: Had nocturnal pauses (1.3 sec) asymptomatic at 1 and 3am. HR during the day 70-80s.O  Update: OOB ambulating in hallway anxious to go home. Denies any c/o    Subjective: "  _____I feel great_________________"    General: No fatigue, no fevers/chills  Respiratory: No dyspnea, no cough, no wheeze  CV: No chest pain, no palpitations  Abd: No nausea  Neuro: No headache, no dizziness  	  Vitals:  T(C): 36.3 (08-24-24 @ 07:32), Max: 36.6 (08-23-24 @ 20:23)  HR: 66 (08-24-24 @ 07:32) (66 - 74)  BP: 131/77 (08-24-24 @ 07:32) (130/53 - 131/77)  RR: 18 (08-24-24 @ 07:32) (18 - 18)  SpO2: 98% (08-24-24 @ 07:32) (98% - 98%)  Wt(kg): --  I&O's Summary    Weight (kg): 101.6 (08-23 @ 03:42)      PHYSICAL EXAM:  Appearance: Comfortable. No acute distress Ambulating in hallway  HEENT:  Head and neck: Atraumatic. Normocephalic.  Normal oral mucosa, PERRL, Neck is supple. No JVD, No carotid bruit.   Neurologic: A & O x 3, no focal deficits. EOMI.  Lymphatic: No cervical lymphadenopathy  Cardiovascular: Normal S1 S2, No murmur, rubs/gallops. No JVD, No edema  Respiratory: Lungs clear to auscultation  Gastrointestinal:  Soft, Non-tender, + BS  Lower Extremities: No edema  Psychiatry: Patient is calm. No agitation. Mood & affect appropriate  Skin: No rashes/ ecchymoses/cyanosis/ulcers visualized on the face, hands or feet.      CURRENT MEDICATIONS:  lisinopril 20 milliGRAM(s) Oral daily    atorvastatin  apixaban  aspirin enteric coated  tamsulosin    	      LABS:	 	                            15.1   4.75  )-----------( 153      ( 23 Aug 2024 07:06 )             44.8     08-24    137  |  108  |  14  ----------------------------<  101<H>  4.1   |  24  |  0.89    Ca    9.9      24 Aug 2024 07:52  Phos  3.4     08-23  Mg     2.1     08-24    TPro  7.0  /  Alb  3.5  /  TBili  1.8<H>  /  DBili  x   /  AST  19  /  ALT  25  /  AlkPhos  47  08-23    proBNP:   Lipid Profile: Date: 08-23 @ 07:06  Total cholesterol 120; Direct LDL: --; HDL: 31; Triglycerides:85        TELEMETRY: Reviewed  as above AF with HR 70-80s during the day, Recurrent Nocturnal pauses 2-3 seconds asymptomatic  ECG:  Reviewed by me. 	    Ventricular Rate 66 BPM    Atrial Rate 97 BPM    QRS Duration 110 ms    Q-T Interval 458 ms    QTC Calculation(Bazett) 480 ms    R Axis 12 degrees    T Axis 10 degrees    Diagnosis Line Atrial fibrillation with premature ventricular or aberrantly conducted complexes  Anterior infarct , age undetermined  Abnormal ECG    Confirmed by Palla MD, Dante ([id number]) on 8/23/2024 7:56:02 PM                                                                                                                                           ELECTROPHYSIOLOGY   PROGRESS NOTE    Reason for follow up: AF with slow VR while on BB  Overnight: Had nocturnal pauses (1.3 sec) asymptomatic at 1 and 3am. HR during the day 70-80s. Pt states he needs his wife to bring in his own CPAP machine-wasn't wearing it.  Update: OOB ambulating in hallway anxious to go home. Denies any c/o    Subjective: "  _____I feel great_________________"    General: No fatigue, no fevers/chills  Respiratory: No dyspnea, no cough, no wheeze  CV: No chest pain, no palpitations  Abd: No nausea  Neuro: No headache, no dizziness  	  Vitals:  T(C): 36.3 (08-24-24 @ 07:32), Max: 36.6 (08-23-24 @ 20:23)  HR: 66 (08-24-24 @ 07:32) (66 - 74)  BP: 131/77 (08-24-24 @ 07:32) (130/53 - 131/77)  RR: 18 (08-24-24 @ 07:32) (18 - 18)  SpO2: 98% (08-24-24 @ 07:32) (98% - 98%)  Wt(kg): --  I&O's Summary    Weight (kg): 101.6 (08-23 @ 03:42)      PHYSICAL EXAM:  Appearance: Comfortable. No acute distress Ambulating in hallway  HEENT:  Head and neck: Atraumatic. Normocephalic.  Normal oral mucosa, PERRL, Neck is supple. No JVD, No carotid bruit.   Neurologic: A & O x 3, no focal deficits. EOMI.  Lymphatic: No cervical lymphadenopathy  Cardiovascular: Normal S1 S2, No murmur, rubs/gallops. No JVD, No edema  Respiratory: Lungs clear to auscultation  Gastrointestinal:  Soft, Non-tender, + BS  Lower Extremities: No edema  Psychiatry: Patient is calm. No agitation. Mood & affect appropriate  Skin: No rashes/ ecchymoses/cyanosis/ulcers visualized on the face, hands or feet.      CURRENT MEDICATIONS:  lisinopril 20 milliGRAM(s) Oral daily    atorvastatin  apixaban  aspirin enteric coated  tamsulosin    	      LABS:	 	                            15.1   4.75  )-----------( 153      ( 23 Aug 2024 07:06 )             44.8     08-24    137  |  108  |  14  ----------------------------<  101<H>  4.1   |  24  |  0.89    Ca    9.9      24 Aug 2024 07:52  Phos  3.4     08-23  Mg     2.1     08-24    TPro  7.0  /  Alb  3.5  /  TBili  1.8<H>  /  DBili  x   /  AST  19  /  ALT  25  /  AlkPhos  47  08-23    proBNP:   Lipid Profile: Date: 08-23 @ 07:06  Total cholesterol 120; Direct LDL: --; HDL: 31; Triglycerides:85        TELEMETRY: Reviewed  as above AF with HR 70-80s during the day, Recurrent Nocturnal pauses 2-3 seconds asymptomatic  ECG:  Reviewed by me. 	    Ventricular Rate 66 BPM    Atrial Rate 97 BPM    QRS Duration 110 ms    Q-T Interval 458 ms    QTC Calculation(Bazett) 480 ms    R Axis 12 degrees    T Axis 10 degrees    Diagnosis Line Atrial fibrillation with premature ventricular or aberrantly conducted complexes  Anterior infarct , age undetermined  Abnormal ECG    Confirmed by Palla MD, Dante ([id number]) on 8/23/2024 7:56:02 PM

## 2024-08-25 LAB
ANION GAP SERPL CALC-SCNC: 4 MMOL/L — LOW (ref 5–17)
BUN SERPL-MCNC: 18 MG/DL — SIGNIFICANT CHANGE UP (ref 7–23)
CALCIUM SERPL-MCNC: 9.6 MG/DL — SIGNIFICANT CHANGE UP (ref 8.5–10.1)
CHLORIDE SERPL-SCNC: 109 MMOL/L — HIGH (ref 96–108)
CO2 SERPL-SCNC: 27 MMOL/L — SIGNIFICANT CHANGE UP (ref 22–31)
CREAT SERPL-MCNC: 0.95 MG/DL — SIGNIFICANT CHANGE UP (ref 0.5–1.3)
EGFR: 79 ML/MIN/1.73M2 — SIGNIFICANT CHANGE UP
GLUCOSE SERPL-MCNC: 107 MG/DL — HIGH (ref 70–99)
POTASSIUM SERPL-MCNC: 4 MMOL/L — SIGNIFICANT CHANGE UP (ref 3.5–5.3)
POTASSIUM SERPL-SCNC: 4 MMOL/L — SIGNIFICANT CHANGE UP (ref 3.5–5.3)
SODIUM SERPL-SCNC: 140 MMOL/L — SIGNIFICANT CHANGE UP (ref 135–145)
TROPONIN I, HIGH SENSITIVITY RESULT: 15.98 NG/L — SIGNIFICANT CHANGE UP

## 2024-08-25 PROCEDURE — 93010 ELECTROCARDIOGRAM REPORT: CPT

## 2024-08-25 PROCEDURE — 99232 SBSQ HOSP IP/OBS MODERATE 35: CPT

## 2024-08-25 RX ORDER — ASPIRIN 81 MG
1 TABLET, DELAYED RELEASE (ENTERIC COATED) ORAL
Qty: 0 | Refills: 0 | DISCHARGE
Start: 2024-08-25

## 2024-08-25 RX ORDER — LISINOPRIL 10 MG/1
1 TABLET ORAL
Qty: 0 | Refills: 0 | DISCHARGE
Start: 2024-08-25

## 2024-08-25 RX ORDER — METOPROLOL TARTRATE 100 MG/1
0 TABLET ORAL
Refills: 0 | DISCHARGE
End: 2024-08-22

## 2024-08-25 RX ORDER — AMLODIPINE BESYLATE 10 MG/1
1 TABLET ORAL
Qty: 30 | Refills: 0
Start: 2024-08-25 | End: 2024-09-23

## 2024-08-25 RX ORDER — LISINOPRIL 10 MG/1
1 TABLET ORAL
Qty: 30 | Refills: 0
Start: 2024-08-25 | End: 2024-09-23

## 2024-08-25 RX ADMIN — TAMSULOSIN HYDROCHLORIDE 0.8 MILLIGRAM(S): 0.4 CAPSULE ORAL at 21:24

## 2024-08-25 RX ADMIN — LISINOPRIL 20 MILLIGRAM(S): 10 TABLET ORAL at 09:09

## 2024-08-25 RX ADMIN — Medication 81 MILLIGRAM(S): at 09:09

## 2024-08-25 RX ADMIN — Medication 10 MILLIGRAM(S): at 21:24

## 2024-08-25 RX ADMIN — ACETAMINOPHEN 650 MILLIGRAM(S): 325 TABLET ORAL at 09:40

## 2024-08-25 RX ADMIN — APIXABAN 5 MILLIGRAM(S): 5 TABLET, FILM COATED ORAL at 09:09

## 2024-08-25 RX ADMIN — ACETAMINOPHEN 650 MILLIGRAM(S): 325 TABLET ORAL at 09:10

## 2024-08-25 RX ADMIN — APIXABAN 5 MILLIGRAM(S): 5 TABLET, FILM COATED ORAL at 21:24

## 2024-08-25 NOTE — DISCHARGE NOTE PROVIDER - NSDCMRMEDTOKEN_GEN_ALL_CORE_FT
amLODIPine 5 mg oral tablet: 1 tab(s) orally once a day ***new medication to replace Metoprolol Sprinkles***  aspirin 81 mg oral delayed release tablet: 1 tab(s) orally once a day  atorvastatin 10 mg oral tablet: 1 tab(s) orally once a day (at bedtime)  Eliquis 5 mg oral tablet: 1 tab(s) orally 2 times a day  lisinopril 20 mg oral tablet: 1 tab(s) orally once a day  Livalo 2 mg oral tablet: 1 tab(s) orally once a day  tamsulosin 0.4 mg oral capsule: 2 cap(s) orally once a day   aspirin 81 mg oral delayed release tablet: 1 tab(s) orally once a day  atorvastatin 10 mg oral tablet: 1 tab(s) orally once a day (at bedtime)  Eliquis 5 mg oral tablet: 1 tab(s) orally 2 times a day  lisinopril 20 mg oral tablet: 1 tab(s) orally once a day  Livalo 2 mg oral tablet: 1 tab(s) orally once a day  tamsulosin 0.4 mg oral capsule: 2 cap(s) orally once a day   amLODIPine 5 mg oral tablet: 1 tab(s) orally once a day  aspirin 81 mg oral delayed release tablet: 1 tab(s) orally once a day  atorvastatin 10 mg oral tablet: 1 tab(s) orally once a day (at bedtime)  Eliquis 5 mg oral tablet: 1 tab(s) orally 2 times a day  lisinopril 20 mg oral tablet: 1 tab(s) orally once a day  Livalo 2 mg oral tablet: 1 tab(s) orally once a day  tamsulosin 0.4 mg oral capsule: 2 cap(s) orally once a day   amLODIPine 5 mg oral tablet: 1 tab(s) orally once a day  Aspirin EC 81 mg oral delayed release tablet: 1 tab(s) orally once a day  atorvastatin 10 mg oral tablet: 1 tab(s) orally once a day (at bedtime)  Eliquis 5 mg oral tablet: 1 tab(s) orally 2 times a day  lisinopril 20 mg oral tablet: 1 tab(s) orally once a day  Livalo 2 mg oral tablet: 1 tab(s) orally once a day  tamsulosin 0.4 mg oral capsule: 2 cap(s) orally once a day

## 2024-08-25 NOTE — PROGRESS NOTE ADULT - SUBJECTIVE AND OBJECTIVE BOX
ELECTROPHYSIOLOGY  PROGRESS NOTE  Reason for follow up:   Overnight: No new events.   Update:     Subjective: "  ____________I felt a bit dizzy before__________"    General: No fatigue, no fevers/chills  Respiratory: No dyspnea, no cough, no wheeze  CV: No chest pain, no palpitations  Abd: No nausea  Neuro: No headache, sl dizziness upon standing  	  Vitals:  T(C): 36.6 (08-25-24 @ 08:28), Max: 37 (08-24-24 @ 20:56)  HR: 84 (08-25-24 @ 08:28) (47 - 84)  BP: 136/89 (08-25-24 @ 08:28) (130/59 - 138/60)  RR: 18 (08-25-24 @ 08:28) (18 - 18)  SpO2: 100% (08-25-24 @ 08:28) (92% - 100%)  Wt(kg): --  I&O's Summary    Weight (kg): 101.6 (08-23 @ 03:42)      PHYSICAL EXAM:  Appearance: Comfortable. No acute distress  HEENT:  Head and neck: Atraumatic. Normocephalic.  Normal oral mucosa, PERRL, Neck is supple. No JVD, No carotid bruit.   Neurologic: A & O x 3, no focal deficits. EOMI.  Lymphatic: No cervical lymphadenopathy  Cardiovascular: Normal S1 S2, No murmur, rubs/gallops. No JVD, No edema  Respiratory: Lungs clear to auscultation  Gastrointestinal:  Soft, Non-tender, + BS  Lower Extremities: No edema  Psychiatry: Patient is calm. No agitation. Mood & affect appropriate  Skin: No rashes/ ecchymoses/cyanosis/ulcers visualized on the face, hands or feet.      CURRENT MEDICATIONS:  lisinopril 20 milliGRAM(s) Oral daily    atorvastatin  apixaban  aspirin enteric coated  tamsulosin   	      LABS:	 	        08-25    140  |  109<H>  |  18  ----------------------------<  107<H>  4.0   |  27  |  0.95    Ca    9.6      25 Aug 2024 06:50  Mg     2.1     08-24      proBNP:   Lipid Profile: Date: 08-23 @ 07:06  Total cholesterol 120; Direct LDL: --; HDL: 31; Triglycerides:85        TELEMETRY: Reviewed  AF 50-60s during the day with 2  2.92 sec nocturnal pauses @ approx 3am ( asymptomatic)   ECG:  Reviewed by me. 	    Ventricular Rate 66 BPM    Atrial Rate 97 BPM    QRS Duration 110 ms    Q-T Interval 458 ms    QTC Calculation(Bazett) 480 ms    R Axis 12 degrees    T Axis 10 degrees    Diagnosis Line Atrial fibrillation with premature ventricular or aberrantly conducted complexes  Anterior infarct , age undetermined  Abnormal ECG    Confirmed by Palla MD, Dante ([id number]) on 8/23/2024 7:56:02 PM

## 2024-08-25 NOTE — DISCHARGE NOTE PROVIDER - HOSPITAL COURSE
.84-year-old male with a past medical history of ALISSON on CPAP, CAD s/p PCI with stents, CABG, HTN/HLD, Afib on eliquis presented with complaints of lightheadedness and high blood pressure associated with headache for 1 day     Dizziness upon standing   Atrial fibrillation with slow ventricular response. secondary to BB  Has nocturnal pauses (2.9  sec longest ) asymptomatic at  3am-  HR during the day 50-70s- likely secondary to sleep apnea - on  home CPAP   recent stress test that was negative for sig ischemia EF 50-55% ,   echo EF 50-55% with mild MR AR and LAE   DC metoprolol ,   cont lisinopril increase to 20 mg daily ,   add amlodipine 5 mg daily   Avoid any further AV mile blockers  Chads Vasc 4,   continue Eliquis uninterrupted  Zio AT to be placed prior to discharge  Patient will follow up with Dr. Iglesias at [facility]   f/u Dr Curiel in 1week  I dw Dr bravo if orthostat negative then cleared for discharge with outpt follow up    hx HTN   cw med as per changes made as above      CAD (coronary artery disease).   ·  Plan: C/w aspirin and statin.     Obstructive sleep apnea.   ·  Plan: C/w CPAP.      8/25 pt reports overall feels better, feels a little lightheaded on immediately standing up but when he makes slow positional changes he is asymptomatic   Physical exam  General: non-toxic  HEENT: non-traumatic, perrla, eomi  Cardio: slow HR  Lungs: comfortable breathing, clear to auscultation  Abdomen: Soft, non-tender, non-distended  Neuro: AOx4 no gabrielle focal deficits   Ext: Pulses +2< from:     CT Head No Cont (08.22.24 @ 22:52) >  No acute intracranial hemorrhage, mass effect, or CT evidence of a large   vascular territory infarct.  Indeterminate age bilateral gangliocapsular lacunar infarcts.84M admitted for symptomatic slow afib.     discharge time 47mins .84-year-old male with a past medical history of ALISSON on CPAP, CAD s/p PCI with stents, CABG, HTN/HLD, Afib on eliquis presented with complaints of lightheadedness and high blood pressure associated with headache for 1 day     Dizziness upon standing  improved- orthostatic hypotension resolved   Atrial fibrillation with slow ventricular response. secondary to BB  Has nocturnal pauses (2.9  sec longest ) asymptomatic at  3am-  HR during the day 50-70s- likely secondary to sleep apnea - on  home CPAP   recent stress test that was negative for sig ischemia EF 50-55% ,   echo EF 50-55% with mild MR AR and LAE   DC metoprolol ,   cont lisinopril increase to 20 mg daily ,   add amlodipine 5 mg daily   Avoid any further AV mile blockers  Chads Vasc 4,   continue Eliquis uninterrupted  Zio AT to be placed prior to discharge  Patient will follow up with Dr. Iglesias at [facility]   f/u Dr Curiel in 1week  I dw Dr bravo if orthostat negative then cleared for discharge with outpt follow up    hx HTN   cw med as per changes made as above      CAD (coronary artery disease).   ·  Plan: C/w aspirin and statin.     Obstructive sleep apnea.   ·  Plan: C/w CPAP.      8/25 pt reports overall feels better, feels a little lightheaded on immediately standing up but when he makes slow positional changes he is asymptomatic   Physical exam  General: non-toxic  HEENT: non-traumatic, perrla, eomi  Cardio: slow HR  Lungs: comfortable breathing, clear to auscultation  Abdomen: Soft, non-tender, non-distended  Neuro: AOx4 no gabrielle focal deficits   Ext: Pulses +2< from:     CT Head No Cont (08.22.24 @ 22:52) >  No acute intracranial hemorrhage, mass effect, or CT evidence of a large   vascular territory infarct.  Indeterminate age bilateral gangliocapsular lacunar infarcts.84M admitted for symptomatic slow afib.     discharge time 47mins .84-year-old male with a past medical history of ALISSON on CPAP, CAD s/p PCI with stents, CABG, HTN/HLD, Afib on eliquis presented with complaints of lightheadedness and high blood pressure associated with headache for 1 day     Dizziness upon standing  improved- orthostatic hypotension resolved   Atrial fibrillation with slow ventricular response. secondary to BB  Has nocturnal pauses (2.9  sec longest ) asymptomatic at  3am-  HR during the day 50-70s- likely secondary to sleep apnea - on  home CPAP   recent stress test that was negative for sig ischemia EF 50-55% ,   echo EF 50-55% with mild MR AR and LAE   DC metoprolol ,   cont lisinopril increase to 20 mg daily ,   add amlodipine 5 mg daily   Avoid any further AV mile blockers  Chads Vasc 4,   continue Eliquis uninterrupted  Zio AT to be placed prior to discharge  Patient will follow up with Dr. Iglesias at [facility]   f/u Dr Curiel in 1week  I dw Dr bravo if orthostat negative then cleared for discharge with outpt follow up    hx HTN   cw med as per changes made as above      CAD (coronary artery disease).   ·  Plan: C/w aspirin and statin.     Obstructive sleep apnea.   ·  Plan: C/w CPAP.      8/25 pt reports overall feels better, feels a little lightheaded on immediately standing up but when he makes slow positional changes he is asymptomatic   Physical exam  General: non-toxic  HEENT: non-traumatic, perrla, eomi  Cardio: slow HR  Lungs: comfortable breathing, clear to auscultation  Abdomen: Soft, non-tender, non-distended  Neuro: AOx4 no gabrielle focal deficits   Ext: Pulses +2< from:     CT Head No Cont (08.22.24 @ 22:52) >  No acute intracranial hemorrhage, mass effect, or CT evidence of a large   vascular territory infarct.  Indeterminate age bilateral gangliocapsular lacunar infarcts.84M admitted for symptomatic slow afib.     discharge time 47mins .84-year-old male with a past medical history of ALISSON on CPAP, CAD s/p PCI with stents, CABG, HTN/HLD, Afib on eliquis presented with complaints of lightheadedness and high blood pressure associated with headache for 1 day     Dizziness upon standing  improved- orthostatic hypotension resolved   Atrial fibrillation with slow ventricular response. secondary to BB  Has nocturnal pauses (2.9  sec longest ) asymptomatic at  3am-  HR during the day 50-70s- likely secondary to sleep apnea - on  home CPAP   recent stress test that was negative for sig ischemia EF 50-55% ,   echo EF 50-55% with mild MR AR and LAE   DC metoprolol ,   cont lisinopril increase to 20 mg daily ,   add amlodipine 5 mg daily   orthostats negative   Avoid any further AV mile blockers  Chads Vasc 4,   continue Eliquis uninterrupted  Zio AT to be placed prior to discharge  Patient will follow up with Dr. Iglesias at [facility]   f/u Dr Curiel in 1week  I dw Dr bravo  who evaluated patient today 8/26 - cleared pt for discharge - did not recommend any further ischemic evaluation at this time   TELE: Afib 30-70, nocturnal pauses <4 seconds   follow up with Dr. Iglesias at [facility]     hx HTN   cw med as per changes made as above      CAD (coronary artery disease).   ·  Plan: C/w aspirin and statin.     Obstructive sleep apnea.   ·  Plan: C/w CPAP.      8/25 pt reports overall feels better, feels a little lightheaded on immediately standing up but when he makes slow positional changes he is asymptomatic   8/26Patient discharged yesterday but reported dizziness and D/C was cancelled Patient was not on telemetry at time of event	, no EKG from event. EKG immediately post event  was without significant changes Denies any further dizziness. Patient has been ambulating with appropriate increase in heart rate. clared by cardio Dr bravo and EP for discharge, Dr bravo note still pending     Physical exam  General: non-toxic  HEENT: non-traumatic, perrla, eomi  Cardio: slow HR  Lungs: comfortable breathing, clear to auscultation  Abdomen: Soft, non-tender, non-distended  Neuro: AOx4 no gabrielle focal deficits   Ext: Pulses +2     from:     CT Head No Cont (08.22.24 @ 22:52) >  No acute intracranial hemorrhage, mass effect, or CT evidence of a large   vascular territory infarct.  Indeterminate age bilateral gangliocapsular lacunar infarcts.84M admitted for symptomatic slow afib.     discharge time 47mins

## 2024-08-25 NOTE — PROGRESS NOTE ADULT - ASSESSMENT
ASSESSMENT & PLAN: 84 year old male with above history with persistent atrial flutter with slow ventricular response.    Has nocturnal pauses (2.9  sec longest ) asymptomatic at  3am-  HR during the day 50-70s CPAP was in use overnight -encouraged pt to wear/ states he had it on c/o sl. + dizziness upon standing  AFib/flutter with slow VR while on beta blocker-now discontinued  Continue telemonitoring for any worsening bradycardia or high degree AV block  Check orthostatic BPs  Avoid any further AV mile blockers  Discussed with patient that if he continues to have symptomatic bradycardia off of AV mile blockers pacemaker may be indicated  Chads Vasc 4, continue Eliquis uninterrupted  Zio AT to be placed prior to discharge  Patient will follow up with Dr. Iglesias at [facility]   Further management and dispo per medicine

## 2024-08-25 NOTE — DISCHARGE NOTE PROVIDER - CARE PROVIDER_API CALL
Margie Iglesias  Cardiac Electrophysiology  [address] [place] [address]  Phone: [phone]  Fax: [phone]  Follow Up Time:     Rob Curiel  Cardiovascular Disease  [address] [place] [address]  Phone: [phone]  Fax: [phone]  Follow Up Time:

## 2024-08-25 NOTE — PROVIDER CONTACT NOTE (CHANGE IN STATUS NOTIFICATION) - ACTION/TREATMENT ORDERED:
MD made aware, stat EKG ordered, patient's discharge cancelled, placed back on the tele monitor. MD Perez will come and see the patient.

## 2024-08-25 NOTE — DISCHARGE NOTE PROVIDER - CARE PROVIDERS DIRECT ADDRESSES
[email or web address]

## 2024-08-25 NOTE — DISCHARGE NOTE PROVIDER - NSDCCPCAREPLAN_GEN_ALL_CORE_FT
PRINCIPAL DISCHARGE DIAGNOSIS  Diagnosis: Lightheaded  Assessment and Plan of Treatment: please follow up with Dr chino in office in 1 week   please change position from laying to sitting and then standing very slowly to avoid dizziness  if you have palpitations, chest pain or shortness of breath or worsening dizziness call 911      SECONDARY DISCHARGE DIAGNOSES  Diagnosis: HTN (hypertension)  Assessment and Plan of Treatment:     Diagnosis: Symptomatic bradycardia  Assessment and Plan of Treatment:     Diagnosis: Atrial fibrillation with slow ventricular response  Assessment and Plan of Treatment:      PRINCIPAL DISCHARGE DIAGNOSIS  Diagnosis: Lightheaded  Assessment and Plan of Treatment: please follow up with Dr chino in office in 1 week  please follow up with Dr Iglesias in office for slow atrial fib evaluation  if you have palpitations, chest pain or shortness of breath or dizziness call 911      SECONDARY DISCHARGE DIAGNOSES  Diagnosis: HTN (hypertension)  Assessment and Plan of Treatment:     Diagnosis: Symptomatic bradycardia  Assessment and Plan of Treatment:     Diagnosis: Atrial fibrillation with slow ventricular response  Assessment and Plan of Treatment:

## 2024-08-26 ENCOUNTER — NON-APPOINTMENT (OUTPATIENT)
Age: 84
End: 2024-08-26

## 2024-08-26 ENCOUNTER — TRANSCRIPTION ENCOUNTER (OUTPATIENT)
Age: 84
End: 2024-08-26

## 2024-08-26 VITALS
HEART RATE: 46 BPM | OXYGEN SATURATION: 100 % | RESPIRATION RATE: 19 BRPM | DIASTOLIC BLOOD PRESSURE: 55 MMHG | SYSTOLIC BLOOD PRESSURE: 139 MMHG

## 2024-08-26 PROCEDURE — 99232 SBSQ HOSP IP/OBS MODERATE 35: CPT

## 2024-08-26 PROCEDURE — 99239 HOSP IP/OBS DSCHRG MGMT >30: CPT

## 2024-08-26 RX ORDER — ASPIRIN 81 MG
1 TABLET, DELAYED RELEASE (ENTERIC COATED) ORAL
Qty: 30 | Refills: 0
Start: 2024-08-26 | End: 2024-09-24

## 2024-08-26 RX ADMIN — APIXABAN 5 MILLIGRAM(S): 5 TABLET, FILM COATED ORAL at 08:59

## 2024-08-26 RX ADMIN — LISINOPRIL 20 MILLIGRAM(S): 10 TABLET ORAL at 08:59

## 2024-08-26 RX ADMIN — Medication 81 MILLIGRAM(S): at 09:00

## 2024-08-26 NOTE — PROGRESS NOTE ADULT - SUBJECTIVE AND OBJECTIVE BOX
.84-year-old male with a past medical history of ALISSON on CPAP, CAD s/p PCI with stents, CABG, HTN/HLD, Afib on eliquis presented with complaints of lightheadedness and high blood pressure associated with headache for 1 day  8/25 pt reports overall feels better, feels a little lightheaded on immediately standing up but when he makes slow positional changes he is asymptomatic   8/26Patient discharged yesterday but reported dizziness and D/C was cancelled Patient was not on telemetry at time of event	, no EKG from event. EKG immediately post event  was without significant changes Denies any further dizziness. Patient has been ambulating with appropriate increase in heart rate. clared by cardio Dr bravo and EP for discharge, Dr bravo note still pending     Physical exam  General: non-toxic  HEENT: non-traumatic, perrla, eomi  Cardio: slow HR  Lungs: comfortable breathing, clear to auscultation  Abdomen: Soft, non-tender, non-distended  Neuro: AOx4 no gabrielle focal deficits       CT Head No Cont (08.22.24 @ 22:52) >  No acute intracranial hemorrhage, mass effect, or CT evidence of a large   vascular territory infarct.  Indeterminate age bilateral gangliocapsular lacunar infarcts.84M admitted for symptomatic slow afib.

## 2024-08-26 NOTE — PROGRESS NOTE ADULT - SUBJECTIVE AND OBJECTIVE BOX
ELECTROPHYSIOLOGY  PROGRESS NOTE  Reason for follow up:   Overnight: No new events.   Update:     8/26/24: Patient discharged yesterday but reported dizziness and D/C was cancelled.  Patient was not on telemetry at time of event	, no EKG from event.  Patient seen OOB to chair this AM. Denies any further dizziness. Patient has been ambulating with appropriate increase in heart rate.  TELE: Afib 30-70, nocturnal pauses <4 seconds    MEDICATIONS  (STANDING):  apixaban 5 milliGRAM(s) Oral every 12 hours  aspirin enteric coated 81 milliGRAM(s) Oral daily  atorvastatin 10 milliGRAM(s) Oral at bedtime  lisinopril 20 milliGRAM(s) Oral daily  tamsulosin 0.8 milliGRAM(s) Oral at bedtime    MEDICATIONS  (PRN):  acetaminophen     Tablet .. 650 milliGRAM(s) Oral every 6 hours PRN Mild Pain (1 - 3)  aluminum hydroxide/magnesium hydroxide/simethicone Suspension 30 milliLiter(s) Oral every 4 hours PRN Dyspepsia  melatonin 3 milliGRAM(s) Oral at bedtime PRN Insomnia  ondansetron Injectable 4 milliGRAM(s) IV Push every 8 hours PRN Nausea and/or Vomiting      PHYSICAL EXAM:  Appearance: Comfortable. No acute distress  HEENT:  Head and neck: Atraumatic. Normocephalic.  Normal oral mucosa, PERRL, Neck is supple. No JVD, No carotid bruit.   Neurologic: A & O x 3, no focal deficits. EOMI.  Lymphatic: No cervical lymphadenopathy  Cardiovascular: Normal S1 S2, No murmur, rubs/gallops. No JVD, No edema  Respiratory: Lungs clear to auscultation  Gastrointestinal:  Soft, Non-tender, + BS  Lower Extremities: No edema  Psychiatry: Patient is calm. No agitation. Mood & affect appropriate  Skin: No rashes/ ecchymoses/cyanosis/ulcers visualized on the face, hands or feet.        LABS:	 	        08-25    140  |  109<H>  |  18  ----------------------------<  107<H>  4.0   |  27  |  0.95    Ca    9.6      25 Aug 2024 06:50  Mg     2.1     08-24      proBNP:   Lipid Profile: Date: 08-23 @ 07:06  Total cholesterol 120; Direct LDL: --; HDL: 31; Triglycerides:85          ECG:      Ventricular Rate 66 BPM    Atrial Rate 97 BPM    QRS Duration 110 ms    Q-T Interval 458 ms    QTC Calculation(Bazett) 480 ms    R Axis 12 degrees    T Axis 10 degrees    Diagnosis Line Atrial fibrillation with premature ventricular or aberrantly conducted complexes  Anterior infarct , age undetermined  Abnormal ECG    Confirmed by Palla MD, Dante ([id number]) on 8/23/2024 7:56:02 PM    Pt. Name:       TONE STAFFORD Study Date:    8/23/2024  MRN:            [id number]      YOB: 1940  Accession #:    [id number]     Age:           84 years  Account#:       [id number]  Gender:        M  Heart Rate:                   Height:        67.00 in (170.18 cm)  Rhythm:                       Weight:        224.00 lb (101.61 kg)  Blood Pressure: 161/71 mmHg   BSA/BMI:       2.12 m² / 35.08 kg/m²  ________________________________________________________________________________________  Referring Physician:    [id number] Narayan Neal  Interpreting Physician: Curtis Lezama MD  Primary Sonographer:    Julio Allen [facility]    CPT:               3D RECONST W/O WKSTATION - 51946.m;ECHO TTE WO CON COMP W                     DOPP - 25634.m  Indication(s):     Abnormal electrocardiogram ECG/EKG - R94.31  Procedure:         Transthoracic echocardiogram with 2-D, M-mode and complete                     spectral and color flow Doppler. Real time and full volume                     3-dimensional imaging performed at the echo machine.  Ordering Location:   Admission Status:  Inpatient  Study Information: Image quality for this study is adequate.    _______________________________________________________________________________________     CONCLUSIONS:      1. Left ventricular systolic function is low normal with an ejection fraction of 51 % by 3D.   2. Mild mitral regurgitation.   3. No pericardial effusion seen.   4. Mild to moderate tricuspid regurgitation.   5. Estimated pulmonary artery systolic pressure is 28 mmHg.   6. Mild aortic regurgitation.

## 2024-08-26 NOTE — CHART NOTE - NSCHARTNOTEFT_GEN_A_CORE
LATE ENTRY    NIGHT Hospitalist - ACP    84-y/o M with PMHx of ALISSON on CPAP, CAD s/p PCI with stents, CABG, HTN/HLD, Afib on Eliquis who presented with lightheadedness and high blood pressure associated with headache x 1 day. Pt reports he has been adjusting BP medications for the past months. Reports episode of lightheadedness a month ago, his pulse was found to be in 40 through the day. Called by RN for a 2.87 sec pause - longest pause 2.9. Pt seen and evaluated at bedside. NAD, asymptomatic and was at rest. HR during the day 50-70s CPAP was in use overnight. EP c/s appreciated.    Plan:  ALL AV mile blockers discontinued  Continue remote tele  Possible pacemaker may be indicated per EP  Bertha Davis 4, continue Eliquis uninterrupted  Zio AT to be placed prior to discharge  Patient will follow up with Dr. Iglesias at [facility]    Yolanda Corrigan, ANP-BC  Dept of Medicine   VIA TEAMS

## 2024-08-26 NOTE — PROGRESS NOTE ADULT - ASSESSMENT
Dizziness upon standing  improved- orthostatic hypotension resolved   Atrial fibrillation with slow ventricular response. secondary to BB  Has nocturnal pauses (2.9  sec longest ) asymptomatic at  3am-  HR during the day 50-70s- likely secondary to sleep apnea - on  home CPAP   recent stress test that was negative for sig ischemia EF 50-55% ,   echo EF 50-55% with mild MR AR and LAE   DC metoprolol ,   cont lisinopril increase to 20 mg daily ,   add amlodipine 5 mg daily   orthostats negative   Avoid any further AV mile blockers  Chads Vasc 4, continue Eliquis uninterrupted  Zio AT to be placed prior to discharge  Patient will follow up with Dr. Iglesias at [facility]   f/u Dr Curiel in 1week  I pily bravo  who evaluated patient today 8/26 - cleared pt for discharge - did not recommend any further ischemic evaluation at this time   TELE: Afib 30-70, nocturnal pauses <4 seconds   follow up with Dr. Iglesias at [facility] hx HTN   cw med as per changes made as above      CAD (coronary artery disease).   ·  Plan: C/w aspirin and statin.     Obstructive sleep apnea.   ·  Plan: C/w CPAP.at home     discharge time 47mins

## 2024-08-26 NOTE — PROGRESS NOTE ADULT - ASSESSMENT
ASSESSMENT & PLAN: 84 year old male with above history with persistent atrial flutter with slow ventricular response.    Has nocturnal pauses <4 second  HR during the day 50-70s CPAP was in use overnight   AFib/flutter with slow VR while on beta blocker-now discontinued  Orthostatics negative yesterday, no tele or EKG for during episode of dizziness  Avoid any further AV mile blockers  Discussed with patient that if he continues to have symptomatic bradycardia off of AV mile blockers pacemaker may be indicated in the future  Chads Vasc 4, continue Eliquis uninterrupted  Zio AT to be placed prior to discharge  Patient will follow up with Dr. Iglesias at [facility]   Further management and dispo per medicine             ASSESSMENT & PLAN: 84 year old male with above history with persistent atrial flutter with slow ventricular response.    Has nocturnal pauses <4 second  HR during the day 50-70s CPAP was in use overnight   AFib/flutter with slow VR while on beta blocker-now discontinued  Orthostatics negative yesterday, no tele or EKG for during episode of dizziness  Avoid any further AV mile blockers  Discussed with patient that if he continues to have symptomatic bradycardia off of AV mile blockers pacemaker may be indicated in the future  Chads Vasc 4, continue Eliquis uninterrupted  Zio AT to be placed prior to discharge  Patient will follow up with Dr. Iglesias at [facility]   Plan discussed with patient, RN and Hospitalist  Further management and dispo per medicine

## 2024-08-26 NOTE — DISCHARGE NOTE NURSING/CASE MANAGEMENT/SOCIAL WORK - PATIENT PORTAL LINK FT
You can access the FollowMyHealth Patient Portal offered by [facility] by registering at the following website: [email or web address]. By joining [employer]’s FollowMyHealth portal, you will also be able to view your health information using other applications (apps) compatible with our system.

## 2024-08-29 ENCOUNTER — NON-APPOINTMENT (OUTPATIENT)
Age: 84
End: 2024-08-29

## 2024-09-05 DIAGNOSIS — G47.33 OBSTRUCTIVE SLEEP APNEA (ADULT) (PEDIATRIC): ICD-10-CM

## 2024-09-05 DIAGNOSIS — I08.0 RHEUMATIC DISORDERS OF BOTH MITRAL AND AORTIC VALVES: ICD-10-CM

## 2024-09-05 DIAGNOSIS — Z95.5 PRESENCE OF CORONARY ANGIOPLASTY IMPLANT AND GRAFT: ICD-10-CM

## 2024-09-05 DIAGNOSIS — I25.10 ATHEROSCLEROTIC HEART DISEASE OF NATIVE CORONARY ARTERY WITHOUT ANGINA PECTORIS: ICD-10-CM

## 2024-09-05 DIAGNOSIS — E78.00 PURE HYPERCHOLESTEROLEMIA, UNSPECIFIED: ICD-10-CM

## 2024-09-05 DIAGNOSIS — N40.0 BENIGN PROSTATIC HYPERPLASIA WITHOUT LOWER URINARY TRACT SYMPTOMS: ICD-10-CM

## 2024-09-05 DIAGNOSIS — I48.91 UNSPECIFIED ATRIAL FIBRILLATION: ICD-10-CM

## 2024-09-05 DIAGNOSIS — R00.1 BRADYCARDIA, UNSPECIFIED: ICD-10-CM

## 2024-09-05 DIAGNOSIS — I49.3 VENTRICULAR PREMATURE DEPOLARIZATION: ICD-10-CM

## 2024-09-05 DIAGNOSIS — Z79.01 LONG TERM (CURRENT) USE OF ANTICOAGULANTS: ICD-10-CM

## 2024-09-05 DIAGNOSIS — Z79.82 LONG TERM (CURRENT) USE OF ASPIRIN: ICD-10-CM

## 2024-09-05 DIAGNOSIS — Z95.1 PRESENCE OF AORTOCORONARY BYPASS GRAFT: ICD-10-CM

## 2024-09-05 DIAGNOSIS — I48.92 UNSPECIFIED ATRIAL FLUTTER: ICD-10-CM

## 2024-11-25 ENCOUNTER — OFFICE (OUTPATIENT)
Dept: URBAN - METROPOLITAN AREA CLINIC 102 | Facility: CLINIC | Age: 84
Setting detail: OPHTHALMOLOGY
End: 2024-11-25
Payer: MEDICARE

## 2024-11-25 DIAGNOSIS — H53.022: ICD-10-CM

## 2024-11-25 DIAGNOSIS — H25.12: ICD-10-CM

## 2024-11-25 DIAGNOSIS — H40.013: ICD-10-CM

## 2024-11-25 DIAGNOSIS — D31.31: ICD-10-CM

## 2024-11-25 DIAGNOSIS — Z96.1: ICD-10-CM

## 2024-11-25 DIAGNOSIS — H40.032: ICD-10-CM

## 2024-11-25 PROCEDURE — 99214 OFFICE O/P EST MOD 30 MIN: CPT | Performed by: OPHTHALMOLOGY

## 2024-11-25 PROCEDURE — 92020 GONIOSCOPY: CPT | Performed by: OPHTHALMOLOGY

## 2024-11-25 PROCEDURE — 92250 FUNDUS PHOTOGRAPHY W/I&R: CPT | Performed by: OPHTHALMOLOGY

## 2024-11-25 PROCEDURE — 92083 EXTENDED VISUAL FIELD XM: CPT | Performed by: OPHTHALMOLOGY

## 2024-11-25 ASSESSMENT — REFRACTION_MANIFEST
OS_CYLINDER: -2.75
OS_CYLINDER: -4.50
OS_SPHERE: +4.75
OD_CYLINDER: -1.00
OS_AXIS: 173
OD_AXIS: 080
OS_VA1: 20/40+1
OD_VA1: 20/25-2
OD_VA1: 20/25-1
OD_ADD: +2.50
OD_CYLINDER: -1.75
OS_SPHERE: +4.75
OS_VA1: 20/40
OD_SPHERE: +0.25
OD_SPHERE: +0.50
OS_AXIS: 170
OD_AXIS: 054
OS_ADD: +2.50

## 2024-11-25 ASSESSMENT — REFRACTION_AUTOREFRACTION
OD_AXIS: 054
OS_CYLINDER: -2.75
OD_SPHERE: +0.50
OD_CYLINDER: -1.00
OS_AXIS: 173
OS_SPHERE: +4.75

## 2024-11-25 ASSESSMENT — CONFRONTATIONAL VISUAL FIELD TEST (CVF)
OS_FINDINGS: FULL
OD_FINDINGS: FULL

## 2024-11-25 ASSESSMENT — REFRACTION_CURRENTRX
OS_OVR_VA: 20/
OS_VPRISM_DIRECTION: SV
OD_SPHERE: +2.50
OD_OVR_VA: 20/
OD_VPRISM_DIRECTION: SV
OS_VPRISM_DIRECTION: SV
OD_CYLINDER: -1.25
OS_SPHERE: +7.25
OD_CYLINDER: -1.25
OS_AXIS: 170
OD_SPHERE: +5.00
OD_VPRISM_DIRECTION: SV
OD_AXIS: 96
OS_AXIS: 168
OS_CYLINDER: -4.50
OS_CYLINDER: -4.50
OD_OVR_VA: 20/
OS_OVR_VA: 20/
OS_SPHERE: +5.00
OD_AXIS: 090

## 2024-11-25 ASSESSMENT — VISUAL ACUITY
OD_BCVA: 20/40+2
OS_BCVA: 20/40+1

## 2024-11-25 ASSESSMENT — TONOMETRY
OD_IOP_MMHG: 13
OS_IOP_MMHG: 17
OD_IOP_MMHG: 21

## 2024-11-25 ASSESSMENT — KERATOMETRY
OD_K2POWER_DIOPTERS: 46.00
OS_AXISANGLE_DEGREES: 088
OS_K1POWER_DIOPTERS: 43.75
OD_K1POWER_DIOPTERS: 45.25
OS_K2POWER_DIOPTERS: 46.25
METHOD_AUTO_MANUAL: AUTO
OD_AXISANGLE_DEGREES: 132

## 2025-02-24 NOTE — DISCHARGE NOTE PROVIDER - NPI NUMBER (FOR SYSADMIN USE ONLY) :
Faxed medication clearance to Dr. Devi office for patient to hold Plavix prior to procedure    [[id number]],[[id number]]

## 2025-06-03 ENCOUNTER — OFFICE (OUTPATIENT)
Dept: URBAN - METROPOLITAN AREA CLINIC 102 | Facility: CLINIC | Age: 85
Setting detail: OPHTHALMOLOGY
End: 2025-06-03
Payer: MEDICARE

## 2025-06-03 DIAGNOSIS — H25.12: ICD-10-CM

## 2025-06-03 DIAGNOSIS — H40.032: ICD-10-CM

## 2025-06-03 DIAGNOSIS — Z96.1: ICD-10-CM

## 2025-06-03 DIAGNOSIS — D31.31: ICD-10-CM

## 2025-06-03 DIAGNOSIS — H53.022: ICD-10-CM

## 2025-06-03 DIAGNOSIS — H40.013: ICD-10-CM

## 2025-06-03 PROCEDURE — 99214 OFFICE O/P EST MOD 30 MIN: CPT | Performed by: OPHTHALMOLOGY

## 2025-06-03 ASSESSMENT — REFRACTION_MANIFEST
OD_ADD: +2.50
OD_CYLINDER: -1.75
OS_CYLINDER: -4.50
OD_VA1: 20/25-2
OS_ADD: +2.50
OS_VA1: 20/40
OD_AXIS: 080
OD_SPHERE: +0.25
OD_ADD: +2.50
OS_CYLINDER: -4.50
OS_AXIS: 170
OD_SPHERE: +0.25
OS_ADD: +2.50
OS_SPHERE: +4.75
OD_VA1: 20/25-2
OD_AXIS: 080
OS_AXIS: 170
OS_VA1: 20/40+1
OS_SPHERE: +5.25
OD_CYLINDER: -1.75

## 2025-06-03 ASSESSMENT — REFRACTION_CURRENTRX
OD_SPHERE: +2.50
OD_SPHERE: +5.00
OD_OVR_VA: 20/
OD_AXIS: 96
OD_VPRISM_DIRECTION: SV
OS_AXIS: 170
OS_VPRISM_DIRECTION: SV
OD_VPRISM_DIRECTION: SV
OD_CYLINDER: -1.25
OS_SPHERE: +7.25
OD_OVR_VA: 20/
OD_CYLINDER: -1.25
OS_OVR_VA: 20/
OS_SPHERE: +5.00
OD_AXIS: 090
OS_CYLINDER: -4.50
OS_AXIS: 168
OS_OVR_VA: 20/
OS_CYLINDER: -4.50
OS_VPRISM_DIRECTION: SV

## 2025-06-03 ASSESSMENT — CONFRONTATIONAL VISUAL FIELD TEST (CVF)
OD_FINDINGS: FULL
OS_FINDINGS: FULL

## 2025-06-03 ASSESSMENT — REFRACTION_AUTOREFRACTION
OD_SPHERE: +0.25
OS_CYLINDER: -3.50
OD_AXIS: 065
OS_SPHERE: +5.25
OD_CYLINDER: -1.25
OS_AXIS: 175

## 2025-06-03 ASSESSMENT — KERATOMETRY
OD_K2POWER_DIOPTERS: 45.75
METHOD_AUTO_MANUAL: AUTO
OD_K1POWER_DIOPTERS: 45.00
OS_AXISANGLE_DEGREES: 082
OS_K2POWER_DIOPTERS: 46.25
OS_K1POWER_DIOPTERS: 43.75
OD_AXISANGLE_DEGREES: 140

## 2025-06-03 ASSESSMENT — TONOMETRY
OD_IOP_MMHG: 17
OS_IOP_MMHG: 21
OS_IOP_MMHG: 17
OD_IOP_MMHG: 16

## 2025-06-03 ASSESSMENT — VISUAL ACUITY
OS_BCVA: 20/40-2
OD_BCVA: 20/40

## 2025-08-15 PROBLEM — H52.03 HYPEROPIA ; BOTH EYES: Status: ACTIVE | Noted: 2025-08-15
